# Patient Record
Sex: FEMALE | Race: WHITE | NOT HISPANIC OR LATINO | Employment: OTHER | URBAN - METROPOLITAN AREA
[De-identification: names, ages, dates, MRNs, and addresses within clinical notes are randomized per-mention and may not be internally consistent; named-entity substitution may affect disease eponyms.]

---

## 2017-04-07 DIAGNOSIS — I10 BENIGN ESSENTIAL HYPERTENSION: ICD-10-CM

## 2017-04-07 RX ORDER — LOSARTAN POTASSIUM AND HYDROCHLOROTHIAZIDE 12.5; 1 MG/1; MG/1
TABLET ORAL
Qty: 90 TABLET | Refills: 0 | Status: SHIPPED | OUTPATIENT
Start: 2017-04-07 | End: 2017-06-27

## 2017-06-27 DIAGNOSIS — I10 BENIGN ESSENTIAL HYPERTENSION: ICD-10-CM

## 2017-06-29 RX ORDER — LOSARTAN POTASSIUM AND HYDROCHLOROTHIAZIDE 12.5; 1 MG/1; MG/1
TABLET ORAL
Qty: 90 TABLET | Refills: 1 | Status: SHIPPED | OUTPATIENT
Start: 2017-06-29 | End: 2017-12-18

## 2017-06-29 NOTE — TELEPHONE ENCOUNTER
Hyzaar      Last Written Prescription Date: 4/7/17  Last Fill Quantity: 90, # refills: 0  Last Office Visit with G, P or [facility] prescribing provider: 10/3/16 with Lucas        Potassium   Date Value Ref Range Status   06/27/2016 3.6 3.4 - 5.3 mmol/L Final     Creatinine   Date Value Ref Range Status   06/27/2016 1.04 0.52 - 1.04 mg/dL Final     BP Readings from Last 3 Encounters:   10/03/16 146/88   09/12/16 (!) 180/96   08/01/16 165/90

## 2017-09-25 DIAGNOSIS — I10 BENIGN ESSENTIAL HYPERTENSION: ICD-10-CM

## 2017-09-26 RX ORDER — AMLODIPINE BESYLATE 10 MG/1
TABLET ORAL
Qty: 90 TABLET | Refills: 0 | Status: SHIPPED | OUTPATIENT
Start: 2017-09-26 | End: 2017-12-18

## 2017-09-26 NOTE — TELEPHONE ENCOUNTER
amLODIPine (NORVASC)      Last Written Prescription Date: 10/03/16  Last Fill Quantity: 90, # refills: 0  Last Office Visit with G, P or [facility] prescribing provider:  10/03/16  Future Office Visit:        BP Readings from Last 3 Encounters:   10/03/16 146/88   09/12/16 (!) 180/96   08/01/16 165/90

## 2017-10-29 DIAGNOSIS — I10 BENIGN ESSENTIAL HYPERTENSION: ICD-10-CM

## 2017-10-30 NOTE — TELEPHONE ENCOUNTER
Metoprolol      Last Written Prescription Date: 12/30/16  Last Fill Quantity: 90, # refills: 3    Last Office Visit with G, [facility] or [facility] prescribing provider:  10/03/2016   Future Office Visit:    Not scheduled    BP Readings from Last 3 Encounters:   10/03/16 146/88   09/12/16 (!) 180/96   08/01/16 165/90

## 2017-11-01 RX ORDER — METOPROLOL SUCCINATE 50 MG/1
TABLET, EXTENDED RELEASE ORAL
Qty: 90 TABLET | Refills: 0 | Status: SHIPPED | OUTPATIENT
Start: 2017-11-01 | End: 2017-12-18

## 2017-12-18 ENCOUNTER — OFFICE VISIT (OUTPATIENT)
Dept: INTERNAL MEDICINE | Facility: OTHER | Age: 62
End: 2017-12-18
Attending: INTERNAL MEDICINE
Payer: COMMERCIAL

## 2017-12-18 VITALS
HEART RATE: 79 BPM | BODY MASS INDEX: 38.51 KG/M2 | TEMPERATURE: 97.8 F | SYSTOLIC BLOOD PRESSURE: 138 MMHG | DIASTOLIC BLOOD PRESSURE: 84 MMHG | HEIGHT: 61 IN | WEIGHT: 204 LBS | OXYGEN SATURATION: 98 %

## 2017-12-18 DIAGNOSIS — I10 BENIGN ESSENTIAL HYPERTENSION: ICD-10-CM

## 2017-12-18 LAB
ALBUMIN SERPL-MCNC: 4 G/DL (ref 3.4–5)
ALP SERPL-CCNC: 91 U/L (ref 40–150)
ALT SERPL W P-5'-P-CCNC: 37 U/L (ref 0–50)
ANION GAP SERPL CALCULATED.3IONS-SCNC: 9 MMOL/L (ref 3–14)
AST SERPL W P-5'-P-CCNC: 22 U/L (ref 0–45)
BILIRUB SERPL-MCNC: 0.8 MG/DL (ref 0.2–1.3)
BUN SERPL-MCNC: 18 MG/DL (ref 7–30)
CALCIUM SERPL-MCNC: 9.3 MG/DL (ref 8.5–10.1)
CHLORIDE SERPL-SCNC: 105 MMOL/L (ref 94–109)
CO2 SERPL-SCNC: 24 MMOL/L (ref 20–32)
CREAT SERPL-MCNC: 1.33 MG/DL (ref 0.52–1.04)
GFR SERPL CREATININE-BSD FRML MDRD: 40 ML/MIN/1.7M2
GLUCOSE SERPL-MCNC: 126 MG/DL (ref 70–99)
POTASSIUM SERPL-SCNC: 3.7 MMOL/L (ref 3.4–5.3)
PROT SERPL-MCNC: 7.8 G/DL (ref 6.8–8.8)
SODIUM SERPL-SCNC: 138 MMOL/L (ref 133–144)

## 2017-12-18 PROCEDURE — 36415 COLL VENOUS BLD VENIPUNCTURE: CPT | Performed by: INTERNAL MEDICINE

## 2017-12-18 PROCEDURE — 99213 OFFICE O/P EST LOW 20 MIN: CPT | Performed by: INTERNAL MEDICINE

## 2017-12-18 PROCEDURE — 80053 COMPREHEN METABOLIC PANEL: CPT | Performed by: INTERNAL MEDICINE

## 2017-12-18 RX ORDER — METOPROLOL SUCCINATE 50 MG/1
50 TABLET, EXTENDED RELEASE ORAL DAILY
Qty: 90 TABLET | Refills: 3 | Status: SHIPPED | OUTPATIENT
Start: 2017-12-18 | End: 2019-01-09

## 2017-12-18 RX ORDER — LOSARTAN POTASSIUM AND HYDROCHLOROTHIAZIDE 12.5; 1 MG/1; MG/1
1 TABLET ORAL DAILY
Qty: 90 TABLET | Refills: 3 | Status: SHIPPED | OUTPATIENT
Start: 2017-12-18 | End: 2018-12-21

## 2017-12-18 RX ORDER — AMLODIPINE BESYLATE 10 MG/1
10 TABLET ORAL DAILY
Qty: 90 TABLET | Refills: 3 | Status: SHIPPED | OUTPATIENT
Start: 2017-12-18 | End: 2018-12-21

## 2017-12-18 ASSESSMENT — ANXIETY QUESTIONNAIRES
IF YOU CHECKED OFF ANY PROBLEMS ON THIS QUESTIONNAIRE, HOW DIFFICULT HAVE THESE PROBLEMS MADE IT FOR YOU TO DO YOUR WORK, TAKE CARE OF THINGS AT HOME, OR GET ALONG WITH OTHER PEOPLE: NOT DIFFICULT AT ALL
5. BEING SO RESTLESS THAT IT IS HARD TO SIT STILL: NOT AT ALL
6. BECOMING EASILY ANNOYED OR IRRITABLE: NOT AT ALL
GAD7 TOTAL SCORE: 5
7. FEELING AFRAID AS IF SOMETHING AWFUL MIGHT HAPPEN: SEVERAL DAYS
2. NOT BEING ABLE TO STOP OR CONTROL WORRYING: SEVERAL DAYS
3. WORRYING TOO MUCH ABOUT DIFFERENT THINGS: SEVERAL DAYS
1. FEELING NERVOUS, ANXIOUS, OR ON EDGE: SEVERAL DAYS

## 2017-12-18 ASSESSMENT — PAIN SCALES - GENERAL: PAINLEVEL: NO PAIN (0)

## 2017-12-18 ASSESSMENT — PATIENT HEALTH QUESTIONNAIRE - PHQ9
SUM OF ALL RESPONSES TO PHQ QUESTIONS 1-9: 1
5. POOR APPETITE OR OVEREATING: SEVERAL DAYS

## 2017-12-18 NOTE — MR AVS SNAPSHOT
"              After Visit Summary   2017    Dominga Salazar    MRN: [id number]           Patient Information     Date Of Birth          1955        Visit Information        Provider Department      2017 8:30 AM Manjinder Zavala, DO [facility]        Today's Diagnoses     Benign essential hypertension           Follow-ups after your visit        Who to contact     If you have questions or need follow up information about today's clinic visit or your schedule please contact [facility] directly at [phone].  Normal or non-critical lab and imaging results will be communicated to you by Socialbombhart, letter or phone within 4 business days after the clinic has received the results. If you do not hear from us within 7 days, please contact the clinic through Socialbombhart or phone. If you have a critical or abnormal lab result, we will notify you by phone as soon as possible.  Submit refill requests through HappyFactory or call your pharmacy and they will forward the refill request to us. Please allow 3 business days for your refill to be completed.          Additional Information About Your Visit        MyChart Information     HappyFactory lets you send messages to your doctor, view your test results, renew your prescriptions, schedule appointments and more. To sign up, go to [email or web address] . Click on \"Log in\" on the left side of the screen, which will take you to the Welcome page. Then click on \"Sign up Now\" on the right side of the page.     You will be asked to enter the access code listed below, as well as some personal information. Please follow the directions to create your username and password.     Your access code is: [email or web address]  Expires: 3/18/2018  8:56 AM     Your access code will  in 90 days. If you need help or a new code, please call your [facility] or [phone].        Care EveryWhere ID     This is your Care EveryWhere ID. This could be used by " "other organizations to access your [place] medical records  [id number]        Your Vitals Were     Pulse Temperature Height Pulse Oximetry BMI (Body Mass Index)       79 97.8  F (36.6  C) (Tympanic) 5' 1\" (1.549 m) 98% 38.55 kg/m2        Blood Pressure from Last 3 Encounters:   12/18/17 138/84   10/03/16 146/88   09/12/16 (!) 180/96    Weight from Last 3 Encounters:   12/18/17 204 lb (92.5 kg)   10/03/16 200 lb (90.7 kg)   09/12/16 201 lb (91.2 kg)              We Performed the Following     Comprehensive metabolic panel (BMP + Alb, Alk Phos, ALT, AST, Total. Bili, TP)          Today's Medication Changes          These changes are accurate as of: 12/18/17  8:56 AM.  If you have any questions, ask your nurse or doctor.               These medicines have changed or have updated prescriptions.        Dose/Directions    amLODIPine 10 MG tablet   Commonly known as:  NORVASC   This may have changed:  See the new instructions.   Used for:  Benign essential hypertension   Changed by:  Manjinder Zavala DO        Dose:  10 mg   Take 1 tablet (10 mg) by mouth daily   Quantity:  90 tablet   Refills:  3       losartan-hydrochlorothiazide 100-12.5 MG per tablet   Commonly known as:  HYZAAR   This may have changed:  See the new instructions.   Used for:  Benign essential hypertension   Changed by:  Manjinder Zavala DO        Dose:  1 tablet   Take 1 tablet by mouth daily   Quantity:  90 tablet   Refills:  3       metoprolol 50 MG 24 hr tablet   Commonly known as:  TOPROL-XL   This may have changed:  See the new instructions.   Used for:  Benign essential hypertension   Changed by:  Manjinder Zavala DO        Dose:  50 mg   Take 1 tablet (50 mg) by mouth daily   Quantity:  90 tablet   Refills:  3            Where to get your medicines      These medications were sent to [id number] - [place] - [address] AT [facility] [address], [place] [address]     " Phone:  [phone]     amLODIPine 10 MG tablet    losartan-hydrochlorothiazide 100-12.5 MG per tablet    metoprolol 50 MG 24 hr tablet                Primary Care Provider Office Phone # Fax #    Yoana Shook [phone] [address]  [place] [address]        Equal Access to Services     [facility]: Hadii aad ku hadasho Soomaali, waaxda luqadaha, qaybta kaalmada adeegyada, waxchristel petersonin hayaan adedino foxmarimaranoop campbell . So [facility] [phone].    ATENCIÓN: Si habla español, tiene a tracy disposición servicios gratuitos de asistencia lingüística. [facility] [phone].    We comply with applicable federal civil rights laws and [place] laws. We do not discriminate on the basis of race, color, national origin, age, disability, sex, sexual orientation, or gender identity.            Thank you!     Thank you for choosing [facility]  for your care. Our goal is always to provide you with excellent care. Hearing back from our patients is one way we can continue to improve our services. Please take a few minutes to complete the written survey that you may receive in the mail after your visit with us. Thank you!             Your Updated Medication List - Protect others around you: Learn how to safely use, store and throw away your medicines at www.disposemymeds.org.          This list is accurate as of: 12/18/17  8:56 AM.  Always use your most recent med list.                   Brand Name Dispense Instructions for use Diagnosis    ADVIL 200 MG tablet   Generic drug:  ibuprofen      Take 400 mg by mouth every 6 hours as needed for mild pain        amLODIPine 10 MG tablet    NORVASC    90 tablet    Take 1 tablet (10 mg) by mouth daily    Benign essential hypertension       aspirin 81 MG EC tablet     90 tablet    Take 1 tablet (81 mg) by mouth daily    Benign essential hypertension       CALCIUM 600-D PO      Take 1,600 mg by mouth daily        losartan-hydrochlorothiazide  100-12.5 MG per tablet    HYZAAR    90 tablet    Take 1 tablet by mouth daily    Benign essential hypertension       MAGNESIUM OXIDE PO      Take 250 mg by mouth daily        metoprolol 50 MG 24 hr tablet    TOPROL-XL    90 tablet    Take 1 tablet (50 mg) by mouth daily    Benign essential hypertension       VITAMIN D3 PO      Take 1,000 Units by mouth daily        ZANTAC PO      Take by mouth as needed for heartburn

## 2017-12-18 NOTE — PROGRESS NOTES
Internal Medicine:    Chief Complaint   Patient presents with     Hypertension     has not been checking b/p at home- monitoring salt intake- not excersing regularaly- no SOB or chest pain to report- not regular headaches - would like April to be pcp      Dominga presents today for follow up of her BP.  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  She denies any chest pain or SOB.  She has been taking the medications as prescribed. She does need refills of her medications today.  She is due for severe routine health maintenance measures but would prefer to follow up with Yoana Shook for these.           Patient Active Problem List   Diagnosis     ACP (advance care planning)     Benign essential hypertension          History reviewed. No pertinent past medical history.         Past Surgical History:   Procedure Laterality Date     GYN SURGERY  1984    cessarian            Social History   Substance Use Topics     Smoking status: Never Smoker     Smokeless tobacco: Never Used     Alcohol use No            Family History   Problem Relation Age of Onset     Hypertension Mother      Obesity Mother      OSTEOPOROSIS Mother      Arthritis Mother      Heart Failure Mother      Hypertension Father      Hyperlipidemia Father      Coronary Artery Disease Father      MI     Hypertension Brother      Hyperlipidemia Brother      Hypertension Son              No Known Allergies         Current Outpatient Prescriptions   Medication Sig Dispense Refill     metoprolol (TOPROL-XL) 50 MG 24 hr tablet Take 1 tablet (50 mg) by mouth daily 90 tablet 3     amLODIPine (NORVASC) 10 MG tablet Take 1 tablet (10 mg) by mouth daily 90 tablet 3     losartan-hydrochlorothiazide (HYZAAR) 100-12.5 MG per tablet Take 1 tablet by mouth daily 90 tablet 3     aspirin 81 MG EC tablet Take 1 tablet (81 mg) by mouth daily 90 tablet 3     ibuprofen (ADVIL) 200  "MG tablet Take 400 mg by mouth every 6 hours as needed for mild pain       Calcium Carbonate-Vitamin D (CALCIUM 600-D PO) Take 1,600 mg by mouth daily       Cholecalciferol (VITAMIN D3 PO) Take 1,000 Units by mouth daily       Ranitidine HCl (ZANTAC PO) Take by mouth as needed for heartburn       MAGNESIUM OXIDE PO Take 250 mg by mouth daily       [DISCONTINUED] metoprolol (TOPROL-XL) 50 MG 24 hr tablet TAKE 1 TABLET BY MOUTH EVERY DAY 90 tablet 0     [DISCONTINUED] amLODIPine (NORVASC) 10 MG tablet TAKE 1 TABLET BY MOUTH EVERY DAY 90 tablet 0     [DISCONTINUED] losartan-hydrochlorothiazide (HYZAAR) 100-12.5 MG per tablet TAKE 1 TABLET BY MOUTH DAILY 90 tablet 1       Review Of Systems:    Skin: negative  Eyes: negative  Respiratory: No shortness of breath, dyspnea on exertion, cough, or hemoptysis  Cardiovascular: negative  Neuro: negative    Objective:   /84 (BP Location: Left arm, Patient Position: Supine, Cuff Size: Adult Large)  Pulse 79  Temp 97.8  F (36.6  C) (Tympanic)  Ht 5' 1\" (1.549 m)  Wt 204 lb (92.5 kg)  SpO2 98%  BMI 38.55 kg/m2  EXAM:  Constitutional: alert and no distress   Cardiovascular: RRR. No murmurs, clicks gallops or rub  Respiratory: Lungs clear  Psychiatric: mentation appears normal and affect normal/bright  NEURO: No focal neurologic deficits    SKIN: No rashes         Orders placed or performed in visit on 12/18/17     metoprolol (TOPROL-XL) 50 MG 24 hr tablet     amLODIPine (NORVASC) 10 MG tablet     losartan-hydrochlorothiazide (HYZAAR) 100-12.5 MG per tablet     CMP pending    Assessment and Plan:    (I10) Benign essential hypertension  Comment:  BP is fine today being less than 140/90.  Denies any chest pain or SOB.  Still reports much social stress in her life.    Plan: metoprolol (TOPROL-XL) 50 MG 24 hr tablet,         amLODIPine (NORVASC) 10 MG tablet,         losartan-hydrochlorothiazide (HYZAAR) 100-12.5         MG per tablet, Comprehensive metabolic panel         " (BMP + Alb, Alk Phos, ALT, AST, Total. Bili,         TP)    Patient would prefer to follow up with Yoana Shook for her routine Health care maintenance.  She was instructed to see Yoana Shook in about three months for the routine Health maintenance measures and BP check.        Manjinder Zavala, DO

## 2017-12-18 NOTE — NURSING NOTE
"Chief Complaint   Patient presents with     Hypertension     has not been checking b/p at home- monitoring salt intake- not excersing regularaly- no SOB or chest pain to report- not regular headaches - would like April to be pcp        Initial /84 (BP Location: Left arm, Patient Position: Supine, Cuff Size: Adult Large)  Pulse 79  Temp 97.8  F (36.6  C) (Tympanic)  Ht 5' 1\" (1.549 m)  Wt 204 lb (92.5 kg)  SpO2 98%  BMI 38.55 kg/m2 Estimated body mass index is 38.55 kg/(m^2) as calculated from the following:    Height as of this encounter: 5' 1\" (1.549 m).    Weight as of this encounter: 204 lb (92.5 kg).  Medication Reconciliation: complete   Klaudia Elizalde LPN      "

## 2017-12-19 ASSESSMENT — ANXIETY QUESTIONNAIRES: GAD7 TOTAL SCORE: 5

## 2017-12-31 ENCOUNTER — HEALTH MAINTENANCE LETTER (OUTPATIENT)
Age: 62
End: 2017-12-31

## 2018-01-02 DIAGNOSIS — I10 BENIGN ESSENTIAL HYPERTENSION: ICD-10-CM

## 2018-01-02 LAB
ANION GAP SERPL CALCULATED.3IONS-SCNC: 7 MMOL/L (ref 3–14)
BUN SERPL-MCNC: 18 MG/DL (ref 7–30)
CALCIUM SERPL-MCNC: 9.1 MG/DL (ref 8.5–10.1)
CHLORIDE SERPL-SCNC: 107 MMOL/L (ref 94–109)
CO2 SERPL-SCNC: 25 MMOL/L (ref 20–32)
CREAT SERPL-MCNC: 1.11 MG/DL (ref 0.52–1.04)
GFR SERPL CREATININE-BSD FRML MDRD: 50 ML/MIN/1.7M2
GLUCOSE SERPL-MCNC: 106 MG/DL (ref 70–99)
POTASSIUM SERPL-SCNC: 3.9 MMOL/L (ref 3.4–5.3)
SODIUM SERPL-SCNC: 139 MMOL/L (ref 133–144)

## 2018-01-02 PROCEDURE — 36415 COLL VENOUS BLD VENIPUNCTURE: CPT | Performed by: NURSE PRACTITIONER

## 2018-01-02 PROCEDURE — 80048 BASIC METABOLIC PNL TOTAL CA: CPT | Performed by: NURSE PRACTITIONER

## 2018-12-19 DIAGNOSIS — I10 BENIGN ESSENTIAL HYPERTENSION: ICD-10-CM

## 2018-12-19 NOTE — LETTER
December 21, 2018      Dominga Salazar  [address] [place] [address] [address] [place] [address]        Dear Dominga,     APPOINTMENT REMINDER:     Our records indicates that it is time for you to be seen for office visit, medication review, and labs to be determined.     Your current medication request will be approved for one refill but you will need to be seen before any additional refills can be approved.  Taking care of your health is important to us, and ongoing visits with your provider are vital to your care.    We look forward to seeing you in the near future.  You may call our office at [phone] to schedule a visit.     Please disregard this notice if you have already made an appointment.      Sincerely,  Yoana Shook NP

## 2018-12-19 NOTE — TELEPHONE ENCOUNTER
losartan-hydrochlorothiazide (HYZAAR) 100-12.5 MG per tablet     Last Written Prescription Date:  12/18/17  Last Fill Quantity: 90,   # refills: 3  Last Office Visit: 12/18/17  Future Office visit:       amlodipine       Last Written Prescription Date:  12/18/17  Last Fill Quantity: 90,   # refills: 3  Last Office Visit: 12/18/17  Future Office visit:

## 2018-12-21 RX ORDER — AMLODIPINE BESYLATE 10 MG/1
10 TABLET ORAL DAILY
Qty: 30 TABLET | Refills: 0 | Status: SHIPPED | OUTPATIENT
Start: 2018-12-21 | End: 2019-01-09

## 2018-12-21 RX ORDER — LOSARTAN POTASSIUM AND HYDROCHLOROTHIAZIDE 12.5; 1 MG/1; MG/1
1 TABLET ORAL DAILY
Qty: 30 TABLET | Refills: 0 | Status: SHIPPED | OUTPATIENT
Start: 2018-12-21 | End: 2019-01-09

## 2019-01-09 ENCOUNTER — OFFICE VISIT (OUTPATIENT)
Dept: FAMILY MEDICINE | Facility: OTHER | Age: 64
End: 2019-01-09
Attending: NURSE PRACTITIONER
Payer: COMMERCIAL

## 2019-01-09 VITALS
HEART RATE: 80 BPM | TEMPERATURE: 98.1 F | HEIGHT: 61 IN | WEIGHT: 206 LBS | DIASTOLIC BLOOD PRESSURE: 76 MMHG | SYSTOLIC BLOOD PRESSURE: 130 MMHG | BODY MASS INDEX: 38.89 KG/M2 | OXYGEN SATURATION: 99 % | RESPIRATION RATE: 16 BRPM

## 2019-01-09 DIAGNOSIS — K21.9 GASTROESOPHAGEAL REFLUX DISEASE WITHOUT ESOPHAGITIS: ICD-10-CM

## 2019-01-09 DIAGNOSIS — D22.30 CHANGE IN FACIAL MOLE: ICD-10-CM

## 2019-01-09 DIAGNOSIS — I10 BENIGN ESSENTIAL HYPERTENSION: Primary | ICD-10-CM

## 2019-01-09 LAB
ALBUMIN SERPL-MCNC: 4.2 G/DL (ref 3.4–5)
ALP SERPL-CCNC: 91 U/L (ref 40–150)
ALT SERPL W P-5'-P-CCNC: 33 U/L (ref 0–50)
ANION GAP SERPL CALCULATED.3IONS-SCNC: 8 MMOL/L (ref 3–14)
AST SERPL W P-5'-P-CCNC: 20 U/L (ref 0–45)
BILIRUB SERPL-MCNC: 0.9 MG/DL (ref 0.2–1.3)
BUN SERPL-MCNC: 16 MG/DL (ref 7–30)
CALCIUM SERPL-MCNC: 9.5 MG/DL (ref 8.5–10.1)
CHLORIDE SERPL-SCNC: 102 MMOL/L (ref 94–109)
CO2 SERPL-SCNC: 24 MMOL/L (ref 20–32)
CREAT SERPL-MCNC: 1.26 MG/DL (ref 0.52–1.04)
GFR SERPL CREATININE-BSD FRML MDRD: 45 ML/MIN/{1.73_M2}
GLUCOSE SERPL-MCNC: 109 MG/DL (ref 70–99)
POTASSIUM SERPL-SCNC: 3.9 MMOL/L (ref 3.4–5.3)
PROT SERPL-MCNC: 8.2 G/DL (ref 6.8–8.8)
SODIUM SERPL-SCNC: 134 MMOL/L (ref 133–144)
TSH SERPL DL<=0.005 MIU/L-ACNC: 2.34 MU/L (ref 0.4–4)

## 2019-01-09 PROCEDURE — 80053 COMPREHEN METABOLIC PANEL: CPT | Performed by: NURSE PRACTITIONER

## 2019-01-09 PROCEDURE — 99214 OFFICE O/P EST MOD 30 MIN: CPT | Performed by: NURSE PRACTITIONER

## 2019-01-09 PROCEDURE — 36415 COLL VENOUS BLD VENIPUNCTURE: CPT | Performed by: NURSE PRACTITIONER

## 2019-01-09 PROCEDURE — 84443 ASSAY THYROID STIM HORMONE: CPT | Performed by: NURSE PRACTITIONER

## 2019-01-09 RX ORDER — METOPROLOL SUCCINATE 50 MG/1
50 TABLET, EXTENDED RELEASE ORAL DAILY
Qty: 90 TABLET | Refills: 1 | Status: SHIPPED | OUTPATIENT
Start: 2019-01-09 | End: 2019-07-20

## 2019-01-09 RX ORDER — AMLODIPINE BESYLATE 10 MG/1
10 TABLET ORAL DAILY
Qty: 90 TABLET | Refills: 1 | Status: SHIPPED | OUTPATIENT
Start: 2019-01-09 | End: 2019-07-20

## 2019-01-09 RX ORDER — LOSARTAN POTASSIUM AND HYDROCHLOROTHIAZIDE 12.5; 1 MG/1; MG/1
1 TABLET ORAL DAILY
Qty: 90 TABLET | Refills: 1 | Status: SHIPPED | OUTPATIENT
Start: 2019-01-09 | End: 2019-07-20

## 2019-01-09 ASSESSMENT — ANXIETY QUESTIONNAIRES
1. FEELING NERVOUS, ANXIOUS, OR ON EDGE: NOT AT ALL
5. BEING SO RESTLESS THAT IT IS HARD TO SIT STILL: NOT AT ALL
6. BECOMING EASILY ANNOYED OR IRRITABLE: NOT AT ALL
7. FEELING AFRAID AS IF SOMETHING AWFUL MIGHT HAPPEN: NOT AT ALL
IF YOU CHECKED OFF ANY PROBLEMS ON THIS QUESTIONNAIRE, HOW DIFFICULT HAVE THESE PROBLEMS MADE IT FOR YOU TO DO YOUR WORK, TAKE CARE OF THINGS AT HOME, OR GET ALONG WITH OTHER PEOPLE: NOT DIFFICULT AT ALL
3. WORRYING TOO MUCH ABOUT DIFFERENT THINGS: NOT AT ALL
2. NOT BEING ABLE TO STOP OR CONTROL WORRYING: NOT AT ALL
GAD7 TOTAL SCORE: 0

## 2019-01-09 ASSESSMENT — PATIENT HEALTH QUESTIONNAIRE - PHQ9
5. POOR APPETITE OR OVEREATING: NOT AT ALL
SUM OF ALL RESPONSES TO PHQ QUESTIONS 1-9: 0

## 2019-01-09 ASSESSMENT — MIFFLIN-ST. JEOR: SCORE: 1426.79

## 2019-01-09 ASSESSMENT — PAIN SCALES - GENERAL: PAINLEVEL: NO PAIN (0)

## 2019-01-09 NOTE — NURSING NOTE
"Chief Complaint   Patient presents with     Hypertension     Gastrophageal Reflux       Initial /76 (BP Location: Right arm, Patient Position: Sitting, Cuff Size: Adult Regular)   Pulse 80   Temp 98.1  F (36.7  C) (Tympanic)   Resp 16   Ht 1.549 m (5' 1\")   Wt 93.4 kg (206 lb)   SpO2 99%   BMI 38.92 kg/m   Estimated body mass index is 38.92 kg/m  as calculated from the following:    Height as of this encounter: 1.549 m (5' 1\").    Weight as of this encounter: 93.4 kg (206 lb).  Medication Reconciliation: complete    Rukhsana Mackenzie MA  "

## 2019-01-09 NOTE — PROGRESS NOTES
SUBJECTIVE:   Dominga Salazar is a 63 year old female who presents to clinic today for the following health issues:      Skin lesion    above upper left lip -near nasolabial fold.  Has become larger, is now quite raised and is 3/4 cm and circular. She would like to see dermatology for excision      Hypertension Follow-up    Outpatient blood pressures are not being checked.    Low Salt Diet: no added salt        GERD/Heartburn  Onset: years    Description:     Burning in chest: YES- depending on what she eats    Intensity: mild    Progression of Symptoms: waxing and waning    Accompanying Signs & Symptoms:  Does it feel like food gets stuck: no  Nausea: no  Vomiting (bloody?): no  Abdominal Pain: no  Black-Tarry stools: no:  Bloody stools: no    History:   Previous ulcers: no    Precipitating factors:   Caffeine use: YES  Alcohol use: no  NSAID/Aspirin use: no  Tobacco use: no   Worse with fatty foods.    Alleviating factors:  Staying away from fatty foods  Therapies Tried and outcome:tums and zantack            Amount of exercise or physical activity: 4-5 days/week for an average of 30-45 minutes    Problems taking medications regularly: No    Medication side effects: none    Diet: low salt        Problem list and histories reviewed & adjusted, as indicated.  Additional history: as documented      Patient Active Problem List   Diagnosis     ACP (advance care planning)     Benign essential hypertension     Gastroesophageal reflux disease without esophagitis     Past Surgical History:   Procedure Laterality Date     GYN SURGERY  1984    cessarian       Social History     Tobacco Use     Smoking status: Never Smoker     Smokeless tobacco: Never Used   Substance Use Topics     Alcohol use: No     Family History   Problem Relation Age of Onset     Hypertension Mother      Obesity Mother      Osteoporosis Mother      Arthritis Mother      Heart Failure Mother      Hypertension Father      Hyperlipidemia Father       Coronary Artery Disease Father         MI     Hypertension Brother      Hyperlipidemia Brother      Hypertension Son          Current Outpatient Medications   Medication Sig Dispense Refill     amLODIPine (NORVASC) 10 MG tablet Take 1 tablet (10 mg) by mouth daily 30 tablet 0     aspirin 81 MG EC tablet Take 1 tablet (81 mg) by mouth daily 90 tablet 3     Cholecalciferol (VITAMIN D3 PO) Take 1,000 Units by mouth daily       ibuprofen (ADVIL) 200 MG tablet Take 400 mg by mouth every 6 hours as needed for mild pain       losartan-hydrochlorothiazide (HYZAAR) 100-12.5 MG tablet Take 1 tablet by mouth daily 30 tablet 0     MAGNESIUM OXIDE PO Take 250 mg by mouth daily       metoprolol (TOPROL-XL) 50 MG 24 hr tablet Take 1 tablet (50 mg) by mouth daily 90 tablet 3     Ranitidine HCl (ZANTAC PO) Take by mouth as needed for heartburn       Calcium Carbonate-Vitamin D (CALCIUM 600-D PO) Take 1,600 mg by mouth daily       No Known Allergies  Recent Labs   Lab Test 01/02/18  1231 12/18/17  0849 06/27/16  1044   A1C  --   --  5.3   LDL  --   --  108*   HDL  --   --  73   TRIG  --   --  81   ALT  --  37 21   CR 1.11* 1.33* 1.04   GFRESTIMATED 50* 40* 54*   GFRESTBLACK 60* 49* 65   POTASSIUM 3.9 3.7 3.6      BP Readings from Last 3 Encounters:   01/09/19 130/76   12/18/17 138/84   10/03/16 146/88    Wt Readings from Last 3 Encounters:   01/09/19 93.4 kg (206 lb)   12/18/17 92.5 kg (204 lb)   10/03/16 90.7 kg (200 lb)                  Labs reviewed in EPIC    Reviewed and updated as needed this visit by clinical staff  Tobacco  Allergies  Meds  Med Hx  Surg Hx  Fam Hx  Soc Hx      Reviewed and updated as needed this visit by Provider         ROS:  Constitutional, HEENT, cardiovascular, pulmonary, gi and gu systems are negative, except as otherwise noted.    OBJECTIVE:     /76 (BP Location: Right arm, Patient Position: Sitting, Cuff Size: Adult Regular)   Pulse 80   Temp 98.1  F (36.7  C) (Tympanic)   Resp 16   Ht  "1.549 m (5' 1\")   Wt 93.4 kg (206 lb)   SpO2 99%   BMI 38.92 kg/m    Body mass index is 38.92 kg/m .       GENERAL: healthy, alert and no distress  EYES: Eyes grossly normal to inspection, PERRL and conjunctivae and sclerae normal  HENT: ear canals and TM's normal, nose and mouth without ulcers or lesions  NECK: no adenopathy, no asymmetry, masses, or scars and thyroid normal to palpation  RESP: lungs clear to auscultation - no rales, rhonchi or wheezes  CV: regular rate and rhythm, normal S1 S2, no S3 or S4, no murmur, click or rub, no peripheral edema and peripheral pulses strong  SKIN: 3/4 cm - tan circular raised lesion, above upper lip, left  PSYCH: mentation appears normal, affect normal/bright          ASSESSMENT/PLAN:       1. Benign essential hypertension  - metoprolol succinate ER (TOPROL-XL) 50 MG 24 hr tablet; Take 1 tablet (50 mg) by mouth daily  Dispense: 90 tablet; Refill: 1  - losartan-hydrochlorothiazide (HYZAAR) 100-12.5 MG tablet; Take 1 tablet by mouth daily  Dispense: 90 tablet; Refill: 1  - amLODIPine (NORVASC) 10 MG tablet; Take 1 tablet (10 mg) by mouth daily  Dispense: 90 tablet; Refill: 1  - TSH with free T4 reflex  - Comprehensive metabolic panel (BMP + Alb, Alk Phos, ALT, AST, Total. Bili, TP)    2. Gastroesophageal reflux disease without esophagitis  - Zantac PRN    3. Change in facial mole  - DERMATOLOGY REFERRAL      Follow-up 6 months      Yoana Shook NP  [facility]  "

## 2019-01-09 NOTE — PATIENT INSTRUCTIONS
ASSESSMENT/PLAN:       1. Benign essential hypertension  - metoprolol succinate ER (TOPROL-XL) 50 MG 24 hr tablet; Take 1 tablet (50 mg) by mouth daily  Dispense: 90 tablet; Refill: 1  - losartan-hydrochlorothiazide (HYZAAR) 100-12.5 MG tablet; Take 1 tablet by mouth daily  Dispense: 90 tablet; Refill: 1  - amLODIPine (NORVASC) 10 MG tablet; Take 1 tablet (10 mg) by mouth daily  Dispense: 90 tablet; Refill: 1  - TSH with free T4 reflex  - Comprehensive metabolic panel (BMP + Alb, Alk Phos, ALT, AST, Total. Bili, TP)    2. Gastroesophageal reflux disease without esophagitis  - Zantac PRN    3. Change in facial mole  - DERMATOLOGY REFERRAL      Follow-up 6 months      Yoana Shook NP  [facility]

## 2019-01-10 ASSESSMENT — ANXIETY QUESTIONNAIRES: GAD7 TOTAL SCORE: 0

## 2019-01-15 ENCOUNTER — TELEPHONE (OUTPATIENT)
Dept: FAMILY MEDICINE | Facility: OTHER | Age: 64
End: 2019-01-15

## 2019-01-15 NOTE — TELEPHONE ENCOUNTER
Called pt, notified her of her insurance's request for her to update her health screenings, mammogram, pap and colonoscopy.  Pt declined, and verbalized understands this came from the insurance company.

## 2019-03-05 ENCOUNTER — TRANSFERRED RECORDS (OUTPATIENT)
Dept: HEALTH INFORMATION MANAGEMENT | Facility: CLINIC | Age: 64
End: 2019-03-05

## 2019-07-20 DIAGNOSIS — I10 BENIGN ESSENTIAL HYPERTENSION: ICD-10-CM

## 2019-07-22 RX ORDER — LOSARTAN POTASSIUM AND HYDROCHLOROTHIAZIDE 12.5; 1 MG/1; MG/1
1 TABLET ORAL DAILY
Qty: 90 TABLET | Refills: 0 | Status: SHIPPED | OUTPATIENT
Start: 2019-07-22 | End: 2019-10-21

## 2019-07-22 RX ORDER — METOPROLOL SUCCINATE 50 MG/1
TABLET, EXTENDED RELEASE ORAL
Qty: 90 TABLET | Refills: 0 | Status: SHIPPED | OUTPATIENT
Start: 2019-07-22 | End: 2019-10-21

## 2019-07-22 RX ORDER — AMLODIPINE BESYLATE 10 MG/1
TABLET ORAL
Qty: 90 TABLET | Refills: 0 | Status: SHIPPED | OUTPATIENT
Start: 2019-07-22 | End: 2019-10-21

## 2019-10-21 DIAGNOSIS — I10 BENIGN ESSENTIAL HYPERTENSION: ICD-10-CM

## 2019-10-24 RX ORDER — LOSARTAN POTASSIUM AND HYDROCHLOROTHIAZIDE 12.5; 1 MG/1; MG/1
1 TABLET ORAL DAILY
Qty: 90 TABLET | Refills: 0 | Status: SHIPPED | OUTPATIENT
Start: 2019-10-24 | End: 2019-10-25

## 2019-10-24 RX ORDER — METOPROLOL SUCCINATE 50 MG/1
TABLET, EXTENDED RELEASE ORAL
Qty: 90 TABLET | Refills: 0 | Status: SHIPPED | OUTPATIENT
Start: 2019-10-24 | End: 2019-12-30

## 2019-10-24 RX ORDER — AMLODIPINE BESYLATE 10 MG/1
TABLET ORAL
Qty: 90 TABLET | Refills: 0 | Status: SHIPPED | OUTPATIENT
Start: 2019-10-24 | End: 2020-01-14

## 2019-10-25 ENCOUNTER — TELEPHONE (OUTPATIENT)
Dept: FAMILY MEDICINE | Facility: OTHER | Age: 64
End: 2019-10-25

## 2019-10-25 DIAGNOSIS — I10 ESSENTIAL HYPERTENSION: Primary | ICD-10-CM

## 2019-10-25 RX ORDER — HYDROCHLOROTHIAZIDE 12.5 MG/1
25 TABLET ORAL DAILY
Qty: 30 TABLET | Refills: 1 | Status: SHIPPED | OUTPATIENT
Start: 2019-10-25 | End: 2019-10-28

## 2019-10-25 RX ORDER — LOSARTAN POTASSIUM 100 MG/1
100 TABLET ORAL DAILY
Qty: 30 TABLET | Refills: 1 | Status: SHIPPED | OUTPATIENT
Start: 2019-10-25 | End: 2019-12-30

## 2019-10-25 NOTE — TELEPHONE ENCOUNTER
Rx sent for Losartan 100 mg and hydrochlorothiazide 12.5 mg    [facility]  [phone]

## 2019-10-25 NOTE — TELEPHONE ENCOUNTER
Received Fax from [facility]'s pharmacy stating that the Losartan that was ordered for the patient is on back order. They are requesting a new RX. Ashley A. Lechevalier, LPN on 10/25/2019 at 8:31 AM

## 2019-10-28 RX ORDER — HYDROCHLOROTHIAZIDE 12.5 MG/1
12.5 TABLET ORAL DAILY
Qty: 30 TABLET | Refills: 1 | Status: SHIPPED | OUTPATIENT
Start: 2019-10-28 | End: 2019-12-30

## 2019-10-28 NOTE — TELEPHONE ENCOUNTER
Patient called, states she doesn't like having to take the 2 different pills instead of just the one and is wondering if this can be changed to something else where she can just get the one pill. Informed her I would speak to PCP regarding this. Stated the 2 pills may just be temporary until the Losartan is no longer on back order. She states they only gave her a 30 and 15 day supply with 1 refill on the 2 medications and she will be out of the losartan tomorrow. Ashley A. Lechevalier, LPN on 10/28/2019 at 8:15 AM

## 2019-10-28 NOTE — TELEPHONE ENCOUNTER
The hydrochlorothiazide dose should have been 12.5 mg daily.  I resent correct prescription.   The losartan / hydrochlorothiazide combination was effective, and since it is unavailable - on backorder, the two separate medications are likely temporary.  I prescribed 1 month with 1 refill.  Assuming that by then, the prior combination drug will be available.    Yoana STRONG  [phone]

## 2019-12-30 ENCOUNTER — NURSE TRIAGE (OUTPATIENT)
Dept: FAMILY MEDICINE | Facility: OTHER | Age: 64
End: 2019-12-30

## 2019-12-30 ENCOUNTER — OFFICE VISIT (OUTPATIENT)
Dept: FAMILY MEDICINE | Facility: OTHER | Age: 64
End: 2019-12-30
Attending: NURSE PRACTITIONER
Payer: COMMERCIAL

## 2019-12-30 VITALS
WEIGHT: 208 LBS | HEART RATE: 100 BPM | DIASTOLIC BLOOD PRESSURE: 74 MMHG | HEIGHT: 61 IN | TEMPERATURE: 98.1 F | OXYGEN SATURATION: 98 % | BODY MASS INDEX: 39.27 KG/M2 | SYSTOLIC BLOOD PRESSURE: 120 MMHG

## 2019-12-30 DIAGNOSIS — I10 BENIGN ESSENTIAL HYPERTENSION: ICD-10-CM

## 2019-12-30 DIAGNOSIS — J01.00 SUBACUTE MAXILLARY SINUSITIS: Primary | ICD-10-CM

## 2019-12-30 PROCEDURE — 99213 OFFICE O/P EST LOW 20 MIN: CPT | Performed by: NURSE PRACTITIONER

## 2019-12-30 RX ORDER — HYDROCHLOROTHIAZIDE 12.5 MG/1
12.5 TABLET ORAL DAILY
Qty: 90 TABLET | Refills: 0 | Status: SHIPPED | OUTPATIENT
Start: 2019-12-30 | End: 2020-01-14

## 2019-12-30 RX ORDER — LOSARTAN POTASSIUM 100 MG/1
100 TABLET ORAL DAILY
Qty: 90 TABLET | Refills: 0 | Status: SHIPPED | OUTPATIENT
Start: 2019-12-30 | End: 2020-01-14

## 2019-12-30 RX ORDER — METOPROLOL SUCCINATE 50 MG/1
50 TABLET, EXTENDED RELEASE ORAL DAILY
Qty: 90 TABLET | Refills: 0 | Status: SHIPPED | OUTPATIENT
Start: 2019-12-30 | End: 2020-01-14

## 2019-12-30 RX ORDER — CEFDINIR 300 MG/1
300 CAPSULE ORAL 2 TIMES DAILY
Qty: 20 CAPSULE | Refills: 0 | Status: SHIPPED | OUTPATIENT
Start: 2019-12-30 | End: 2020-01-03

## 2019-12-30 ASSESSMENT — MIFFLIN-ST. JEOR: SCORE: 1430.86

## 2019-12-30 ASSESSMENT — PAIN SCALES - GENERAL: PAINLEVEL: NO PAIN (0)

## 2019-12-30 NOTE — PATIENT INSTRUCTIONS
Assessment & Plan     1. Subacute maxillary sinusitis  - cefdinir (OMNICEF) 300 MG capsule; Take 1 capsule (300 mg) by mouth 2 times daily for 10 days  Dispense: 20 capsule; Refill: 0      Insure adequate fluid intake  Get plenty of rest  Monitor for temp at home, treat with OTC Tylenol or Ibuprofen per package instruction.  Humidity at home (add bacteriostatic solution to humidifier)  Please return in you do not improve  To UC or ER with persistent, worsening, or concerning symptoms      Please set up an appointment to Follow-up regarding your blood pressure  Mammogram due        Yoana Shook CNP  [facility]

## 2019-12-30 NOTE — PROGRESS NOTES
Dominga Salazar is a 64 year old female who presents to clinic today for the following health issues:      Acute Illness   Acute illness concerns: sinus infection  Onset: 3 weeks with worsening in the last week    Fever: YES    Chills/Sweats: YES- chills    Headache (location?): no    Sinus Pressure:YES- facial pain and mucopurulent discharge    Conjunctivitis:  no    Ear Pain: no    Rhinorrhea: no    Congestion: no    Sore Throat: YES- in evening     Cough: no    Wheeze: no    Decreased Appetite: no    Nausea: no    Vomiting: no    Diarrhea:  no    Dysuria/Freq.: no    Fatigue/Achiness: yes    Sick/Strep Exposure: no     Therapies Tried and outcome: otc organic nasal spray- mucinex           Patient Active Problem List   Diagnosis     ACP (advance care planning)     Benign essential hypertension     Gastroesophageal reflux disease without esophagitis     Past Surgical History:   Procedure Laterality Date      SECTION         Social History     Tobacco Use     Smoking status: Never Smoker     Smokeless tobacco: Never Used   Substance Use Topics     Alcohol use: No     Family History   Problem Relation Age of Onset     Hypertension Mother      Obesity Mother      Osteoporosis Mother      Arthritis Mother      Heart Failure Mother      Hypertension Father      Hyperlipidemia Father      Coronary Artery Disease Father         MI     Hypertension Brother      Hyperlipidemia Brother      Hypertension Son            Current Outpatient Medications   Medication Sig Dispense Refill     amLODIPine (NORVASC) 10 MG tablet TAKE 1 TABLET(10 MG) BY MOUTH DAILY 90 tablet 0     aspirin 81 MG EC tablet Take 1 tablet (81 mg) by mouth daily 90 tablet 3     Calcium Carbonate-Vitamin D (CALCIUM 600-D PO) Take 1,600 mg by mouth daily       Cholecalciferol (VITAMIN D3 PO) Take 1,000 Units by mouth daily       hydrochlorothiazide (HYDRODIURIL) 12.5 MG tablet Take 1 tablet (12.5 mg) by mouth daily 30 tablet 1     ibuprofen  "(ADVIL) 200 MG tablet Take 400 mg by mouth every 6 hours as needed for mild pain       losartan (COZAAR) 100 MG tablet Take 1 tablet (100 mg) by mouth daily 30 tablet 1     MAGNESIUM OXIDE PO Take 250 mg by mouth daily       metoprolol succinate ER (TOPROL-XL) 50 MG 24 hr tablet TAKE 1 TABLET(50 MG) BY MOUTH DAILY 90 tablet 0       No Known Allergies       Recent Labs   Lab Test 01/09/19  1505 01/02/18  1231 12/18/17  0849 06/27/16  1044   A1C  --   --   --  5.3   LDL  --   --   --  108*   HDL  --   --   --  73   TRIG  --   --   --  81   ALT 33  --  37 21   CR 1.26* 1.11* 1.33* 1.04   GFRESTIMATED 45* 50* 40* 54*   GFRESTBLACK 52* 60* 49* 65   POTASSIUM 3.9 3.9 3.7 3.6   TSH 2.34  --   --   --         BP Readings from Last 3 Encounters:   12/30/19 120/74   01/09/19 130/76   12/18/17 138/84    Wt Readings from Last 3 Encounters:   12/30/19 94.3 kg (208 lb)   01/09/19 93.4 kg (206 lb)   12/18/17 92.5 kg (204 lb)                 Reviewed and updated as needed this visit by Provider           Review of Systems   ROS COMP: Constitutional, HEENT, cardiovascular, pulmonary, gi and gu systems are negative, except as otherwise noted.        Objective    /74 (BP Location: Right arm, Patient Position: Chair, Cuff Size: Adult Large)   Pulse 100   Temp 98.1  F (36.7  C) (Tympanic)   Ht 1.549 m (5' 1\")   Wt 94.3 kg (208 lb)   SpO2 98%   BMI 39.30 kg/m    Body mass index is 39.3 kg/m .       Physical Exam   GENERAL: healthy, alert and no distress  HENT: sinuses: maxillary, frontal tenderness on right  NECK: no adenopathy, no asymmetry, masses, or scars and thyroid normal to palpation  RESP: lungs clear to auscultation - no rales, rhonchi or wheezes  CV: regular rate and rhythm, normal S1 S2, no S3 or S4, no murmur, click or rub, no peripheral edema and peripheral pulses strong  MS: no gross musculoskeletal defects noted, no edema  SKIN: no suspicious lesions or rashes            Assessment & Plan     1. Subacute " maxillary sinusitis  - cefdinir (OMNICEF) 300 MG capsule; Take 1 capsule (300 mg) by mouth 2 times daily for 10 days  Dispense: 20 capsule; Refill: 0      Insure adequate fluid intake  Get plenty of rest  Monitor for temp at home, treat with OTC Tylenol or Ibuprofen per package instruction.  Humidity at home (add bacteriostatic solution to humidifier)  Please return in you do not improve  To UC or ER with persistent, worsening, or concerning symptoms      Please set up an appointment to Follow-up regarding your blood pressure  Mammogram due        Yoana Shook, CNP  [facility]

## 2019-12-30 NOTE — NURSING NOTE
"Chief Complaint   Patient presents with     Sinus Problem     right sided congestion        Initial /74 (BP Location: Right arm, Patient Position: Chair, Cuff Size: Adult Large)   Pulse 100   Temp 98.1  F (36.7  C) (Tympanic)   Ht 1.549 m (5' 1\")   Wt 94.3 kg (208 lb)   SpO2 98%   BMI 39.30 kg/m   Estimated body mass index is 39.3 kg/m  as calculated from the following:    Height as of this encounter: 1.549 m (5' 1\").    Weight as of this encounter: 94.3 kg (208 lb).  Medication Reconciliation: complete  Keke Torrez LPN    "

## 2019-12-30 NOTE — TELEPHONE ENCOUNTER
"  Additional Information    Negative: Sounds like a life-threatening emergency to the triager    Negative: Difficulty breathing, and not from stuffy nose (e.g., not relieved by cleaning out the nose)    Negative: SEVERE headache and has fever    Negative: Patient sounds very sick or weak to the triager    Negative: SEVERE sinus pain    Negative: Severe headache    Negative: Redness or swelling on the cheek, forehead, or around the eye    Negative: Fever > 103 F (39.4 C)    Negative: Fever > 101 F (38.3 C) and over 60 years of age    Negative: Fever > 100.0 F (37.8 C) and has diabetes mellitus or a weak immune system (e.g., HIV positive, cancer chemotherapy, organ transplant, splenectomy, chronic steroids)    Negative: Fever > 100.0 F (37.8 C) and bedridden (e.g., nursing home patient, stroke, chronic illness, recovering from surgery)    Negative: Fever present > 3 days (72 hours)    Negative: Fever returns after gone for over 24 hours and symptoms worse or not improved    Negative: Sinus pain (not just congestion) and fever    Negative: Earache    Negative: Sinus congestion (pressure, fullness) present > 10 days    Negative: Nasal discharge present > 10 days    Negative: Using nasal washes and pain medicine > 24 hours and sinus pain (lower forehead, cheekbone, or eye) persists    Negative: Lots of coughing    Negative: Patient wants to be seen    Negative: Sinus congestion as part of a cold, present < 10 days    Answer Assessment - Initial Assessment Questions  1. LOCATION: \"Where does it hurt?\"       Right side of my sinus   2. ONSET: \"When did the sinus pain start?\"  (e.g., hours, days)       3 weeks ago   3. SEVERITY: \"How bad is the pain?\"   (Scale 1-10; mild, moderate or severe)    - MILD (1-3): doesn't interfere with normal activities     - MODERATE (4-7): interferes with normal activities (e.g., work or school) or awakens from sleep    - SEVERE (8-10): excruciating pain and patient unable to do any normal " "activities         Mild   4. RECURRENT SYMPTOM: \"Have you ever had sinus problems before?\" If so, ask: \"When was the last time?\" and \"What happened that time?\"       This is not something I am normally prone to.   5. NASAL CONGESTION: \"Is the nose blocked?\" If so, ask, \"Can you open it or must you breathe through the mouth?\"      My right side is blocked, yes I have a foul odor and taste in my mouth   6. NASAL DISCHARGE: \"Do you have discharge from your nose?\" If so ask, \"What color?\"      Did not say, states its blocked   7. FEVER: \"Do you have a fever?\" If so, ask: \"What is it, how was it measured, and when did it start?\"       NO   8. OTHER SYMPTOMS: \"Do you have any other symptoms?\" (e.g., sore throat, cough, earache, difficulty breathing)      Foul taste and odor from mouth   9. PREGNANCY: \"Is there any chance you are pregnant?\" \"When was your last menstrual period?\"      NO    Protocols used: SINUS PAIN AND CONGESTION-A-OH    "

## 2020-01-03 ENCOUNTER — TELEPHONE (OUTPATIENT)
Dept: FAMILY MEDICINE | Facility: OTHER | Age: 65
End: 2020-01-03

## 2020-01-03 DIAGNOSIS — J01.00 SUBACUTE MAXILLARY SINUSITIS: Primary | ICD-10-CM

## 2020-01-03 RX ORDER — CEFPROZIL 500 MG/1
500 TABLET, FILM COATED ORAL 2 TIMES DAILY
Qty: 14 TABLET | Refills: 0 | Status: SHIPPED | OUTPATIENT
Start: 2020-01-03 | End: 2020-01-15

## 2020-01-03 NOTE — TELEPHONE ENCOUNTER
discontinue Omnicef - I sent Cefzil to pharmacy for 7 days.  Probiotic daily.  Come in if diarrhea continues or worsens.  UC or ER as needed    Yoana STRONG  [phone]

## 2020-01-03 NOTE — TELEPHONE ENCOUNTER
Pt calling, states has been taking Omnicef sin Mon for sinus infection    Is working, however, has pretty bad diarrhea, can somewhat control it with Imodium, but doesn't;t feel she should take it so often with her HTN?    Requesting antibiotic change, or,will cont with the Imodium if you think it's ok to take fir the duration of the tx.    Mary Ann Gibson LPN

## 2020-01-13 NOTE — PROGRESS NOTES
Dominga Salazar is a 64 year old female who presents to clinic today for the following health issues:          Hypertension Follow-up    Do you check your blood pressure regularly outside of the clinic? No     Are you following a low salt diet? No    Are your blood pressures ever more than 140 on the top number (systolic) OR more   than 90 on the bottom number (diastolic), for example 140/90? No        GERD/Heartburn    Duration: ongoing    Description (location/character/radiation): burning in chest/throat    Intensity:  mild    Accompanying signs and symptoms:  food getting stuck: no   nausea/vomiting/blood: no   abdominal pain: no   black/tarry or bloody stools: no :    History (similar episodes/previous evaluation): None    Precipitating or alleviating factors:  worse with fatty foods and cinnamon.  current NSAID/Aspirin use: YES    Therapies tried and outcome: antacids          How many servings of fruits and vegetables do you eat daily?  2-3    On average, how many sweetened beverages do you drink each day (Examples: soda, juice, sweet tea, etc.  Do NOT count diet or artificially sweetened beverages)?   0    How many days per week do you miss taking your medication? 0        PHQ-9 SCORE 2017 2019 1/15/2020   PHQ-9 Total Score 1 0 1     MARTY-7 SCORE 2017 2019 1/15/2020   Total Score 5 0 3           Patient Active Problem List   Diagnosis     ACP (advance care planning)     Benign essential hypertension     Gastroesophageal reflux disease without esophagitis     Past Surgical History:   Procedure Laterality Date      SECTION  1984       Social History     Tobacco Use     Smoking status: Never Smoker     Smokeless tobacco: Never Used   Substance Use Topics     Alcohol use: No     Family History   Problem Relation Age of Onset     Hypertension Mother      Obesity Mother      Osteoporosis Mother      Arthritis Mother      Heart Failure Mother      Hypertension Father       Hyperlipidemia Father      Coronary Artery Disease Father         MI     Hypertension Brother      Hyperlipidemia Brother      Hypertension Son              Current Outpatient Medications   Medication Sig Dispense Refill     amLODIPine (NORVASC) 10 MG tablet Take 1 tablet (10 mg) by mouth daily 90 tablet 1     aspirin 81 MG EC tablet Take 1 tablet (81 mg) by mouth daily 90 tablet 3     Calcium Carbonate-Vitamin D (CALCIUM 600-D PO) Take 1,600 mg by mouth daily       Cholecalciferol (VITAMIN D3 PO) Take 1,000 Units by mouth daily       hydrochlorothiazide (HYDRODIURIL) 12.5 MG tablet Take 1 tablet (12.5 mg) by mouth daily 90 tablet 1     ibuprofen (ADVIL) 200 MG tablet Take 400 mg by mouth every 6 hours as needed for mild pain       losartan (COZAAR) 100 MG tablet Take 1 tablet (100 mg) by mouth daily 90 tablet 1     MAGNESIUM OXIDE PO Take 250 mg by mouth daily       metoprolol succinate ER (TOPROL-XL) 50 MG 24 hr tablet Take 1 tablet (50 mg) by mouth daily 90 tablet 1         No Known Allergies         Recent Labs   Lab Test 01/09/19  1505 01/02/18  1231 12/18/17  0849 06/27/16  1044   A1C  --   --   --  5.3   LDL  --   --   --  108*   HDL  --   --   --  73   TRIG  --   --   --  81   ALT 33  --  37 21   CR 1.26* 1.11* 1.33* 1.04   GFRESTIMATED 45* 50* 40* 54*   GFRESTBLACK 52* 60* 49* 65   POTASSIUM 3.9 3.9 3.7 3.6   TSH 2.34  --   --   --           BP Readings from Last 3 Encounters:   01/15/20 118/74   12/30/19 120/74   01/09/19 130/76    Wt Readings from Last 3 Encounters:   01/15/20 93 kg (205 lb)   12/30/19 94.3 kg (208 lb)   01/09/19 93.4 kg (206 lb)                 Reviewed and updated as needed this visit by Provider         Review of Systems   ROS COMP: Constitutional, HEENT, cardiovascular, pulmonary, GI, , musculoskeletal, neuro, skin, endocrine and psych systems are negative, except as otherwise noted.          Objective    /74 (BP Location: Left arm, Patient Position: Sitting, Cuff Size: Adult  Regular)   Pulse 81   Temp 99.2  F (37.3  C) (Tympanic)   Wt 93 kg (205 lb)   SpO2 97%   BMI 38.73 kg/m    Body mass index is 38.73 kg/m .         Physical Exam   GENERAL: healthy, alert and no distress  EYES: Eyes grossly normal to inspection, PERRL and conjunctivae and sclerae normal  HENT: ear canals and TM's normal, nose and mouth without ulcers or lesions  NECK: no adenopathy, no asymmetry, masses, or scars and thyroid normal to palpation  RESP: lungs clear to auscultation - no rales, rhonchi or wheezes  CV: regular rate and rhythm, normal S1 S2, no S3 or S4, no murmur, click or rub, no peripheral edema and peripheral pulses strong  ABDOMEN: soft, nontender, no hepatosplenomegaly, no masses and bowel sounds normal  MS: no gross musculoskeletal defects noted, no edema  PSYCH: mentation appears normal, affect normal/bright    Results for orders placed or performed in visit on 01/15/20   Lipid Profile     Status: Abnormal   Result Value Ref Range    Cholesterol 220 (H) <200 mg/dL    Triglycerides 87 <150 mg/dL    HDL Cholesterol 70 >49 mg/dL    LDL Cholesterol Calculated 133 (H) <100 mg/dL    Non HDL Cholesterol 150 (H) <130 mg/dL   Comprehensive metabolic panel (BMP + Alb, Alk Phos, ALT, AST, Total. Bili, TP)     Status: Abnormal   Result Value Ref Range    Sodium 139 133 - 144 mmol/L    Potassium 3.8 3.4 - 5.3 mmol/L    Chloride 106 94 - 109 mmol/L    Carbon Dioxide 24 20 - 32 mmol/L    Anion Gap 9 3 - 14 mmol/L    Glucose 124 (H) 70 - 99 mg/dL    Urea Nitrogen 19 7 - 30 mg/dL    Creatinine 1.28 (H) 0.52 - 1.04 mg/dL    GFR Estimate 44 (L) >60 mL/min/[1.73_m2]    GFR Estimate If Black 51 (L) >60 mL/min/[1.73_m2]    Calcium 9.7 8.5 - 10.1 mg/dL    Bilirubin Total 0.9 0.2 - 1.3 mg/dL    Albumin 3.8 3.4 - 5.0 g/dL    Protein Total 8.0 6.8 - 8.8 g/dL    Alkaline Phosphatase 96 40 - 150 U/L    ALT 48 0 - 50 U/L    AST 21 0 - 45 U/L   *UA reflex to Microscopic and Culture - [facility]     Status: Abnormal    Result Value Ref Range    Color Urine Yellow     Appearance Urine Clear     Glucose Urine Negative NEG^Negative mg/dL    Bilirubin Urine Negative NEG^Negative    Ketones Urine Negative NEG^Negative mg/dL    Specific Gravity Urine 1.010 1.003 - 1.035    Blood Urine Negative NEG^Negative    pH Urine 6.5 5.0 - 7.0 pH    Protein Albumin Urine Negative NEG^Negative mg/dL    Urobilinogen Urine 0.2 0.2 - 1.0 EU/dL    Nitrite Urine Negative NEG^Negative    Leukocyte Esterase Urine Trace (A) NEG^Negative    Source Midstream Urine    TSH with free T4 reflex     Status: None   Result Value Ref Range    TSH 3.66 0.40 - 4.00 mU/L   Urine Microscopic     Status: None   Result Value Ref Range    WBC Urine 0 - 5 OTO5^0 - 5 /HPF    RBC Urine O - 2 OTO2^O - 2 /HPF    Squamous Epithelial /LPF Urine Few FEW^Few /LPF         The 10-year ASCVD risk score (Bety FERRER Jr., et al., 2013) is: 5.4%    Values used to calculate the score:      Age: 64 years      Sex: Female      Is Non- : No      Diabetic: No      Tobacco smoker: No      Systolic Blood Pressure: 118 mmHg      Is BP treated: Yes      HDL Cholesterol: 70 mg/dL      Total Cholesterol: 220 mg/dL        Assessment & Plan     1. Benign essential hypertension  - Lipid Profile  - Comprehensive metabolic panel (BMP + Alb, Alk Phos, ALT, AST, Total. Bili, TP)  - UA reflex to Microscopic and Culture - [facility]  - TSH with free T4 reflex  - amLODIPine (NORVASC) 10 MG tablet; Take 1 tablet (10 mg) by mouth daily  Dispense: 90 tablet; Refill: 1  - losartan (COZAAR) 100 MG tablet; Take 1 tablet (100 mg) by mouth daily  Dispense: 90 tablet; Refill: 1  - hydrochlorothiazide (HYDRODIURIL) 12.5 MG tablet; Take 1 tablet (12.5 mg) by mouth daily  Dispense: 90 tablet; Refill: 1  - metoprolol succinate ER (TOPROL-XL) 50 MG 24 hr tablet; Take 1 tablet (50 mg) by mouth daily  Dispense: 90 tablet; Refill: 1  - Urine Microscopic    2. Gastroesophageal reflux disease without  esophagitis  - Follow-up as needed    3. Encounter for screening mammogram for breast cancer  - MA Screening Digital Bilateral; Future         Please schedule a pap when you set up your next appointment      Return in about 6 months (around 7/15/2020).         Yoana Shook CNP  [facility]

## 2020-01-15 ENCOUNTER — OFFICE VISIT (OUTPATIENT)
Dept: FAMILY MEDICINE | Facility: OTHER | Age: 65
End: 2020-01-15
Attending: NURSE PRACTITIONER
Payer: COMMERCIAL

## 2020-01-15 VITALS
DIASTOLIC BLOOD PRESSURE: 74 MMHG | BODY MASS INDEX: 38.73 KG/M2 | WEIGHT: 205 LBS | SYSTOLIC BLOOD PRESSURE: 118 MMHG | HEART RATE: 81 BPM | OXYGEN SATURATION: 97 % | TEMPERATURE: 99.2 F

## 2020-01-15 DIAGNOSIS — Z12.31 ENCOUNTER FOR SCREENING MAMMOGRAM FOR BREAST CANCER: ICD-10-CM

## 2020-01-15 DIAGNOSIS — R73.09 ELEVATED GLUCOSE LEVEL: Primary | ICD-10-CM

## 2020-01-15 DIAGNOSIS — K21.9 GASTROESOPHAGEAL REFLUX DISEASE WITHOUT ESOPHAGITIS: ICD-10-CM

## 2020-01-15 DIAGNOSIS — I10 BENIGN ESSENTIAL HYPERTENSION: Primary | ICD-10-CM

## 2020-01-15 LAB
ALBUMIN SERPL-MCNC: 3.8 G/DL (ref 3.4–5)
ALBUMIN UR-MCNC: NEGATIVE MG/DL
ALP SERPL-CCNC: 96 U/L (ref 40–150)
ALT SERPL W P-5'-P-CCNC: 48 U/L (ref 0–50)
ANION GAP SERPL CALCULATED.3IONS-SCNC: 9 MMOL/L (ref 3–14)
APPEARANCE UR: CLEAR
AST SERPL W P-5'-P-CCNC: 21 U/L (ref 0–45)
BILIRUB SERPL-MCNC: 0.9 MG/DL (ref 0.2–1.3)
BILIRUB UR QL STRIP: NEGATIVE
BUN SERPL-MCNC: 19 MG/DL (ref 7–30)
CALCIUM SERPL-MCNC: 9.7 MG/DL (ref 8.5–10.1)
CHLORIDE SERPL-SCNC: 106 MMOL/L (ref 94–109)
CHOLEST SERPL-MCNC: 220 MG/DL
CO2 SERPL-SCNC: 24 MMOL/L (ref 20–32)
COLOR UR AUTO: YELLOW
CREAT SERPL-MCNC: 1.28 MG/DL (ref 0.52–1.04)
EST. AVERAGE GLUCOSE BLD GHB EST-MCNC: 120 MG/DL
GFR SERPL CREATININE-BSD FRML MDRD: 44 ML/MIN/{1.73_M2}
GLUCOSE SERPL-MCNC: 124 MG/DL (ref 70–99)
GLUCOSE UR STRIP-MCNC: NEGATIVE MG/DL
HBA1C MFR BLD: 5.8 % (ref 0–5.6)
HDLC SERPL-MCNC: 70 MG/DL
HGB UR QL STRIP: NEGATIVE
KETONES UR STRIP-MCNC: NEGATIVE MG/DL
LDLC SERPL CALC-MCNC: 133 MG/DL
LEUKOCYTE ESTERASE UR QL STRIP: ABNORMAL
NITRATE UR QL: NEGATIVE
NON-SQ EPI CELLS #/AREA URNS LPF: NORMAL /LPF
NONHDLC SERPL-MCNC: 150 MG/DL
PH UR STRIP: 6.5 PH (ref 5–7)
POTASSIUM SERPL-SCNC: 3.8 MMOL/L (ref 3.4–5.3)
PROT SERPL-MCNC: 8 G/DL (ref 6.8–8.8)
RBC #/AREA URNS AUTO: NORMAL /HPF
SODIUM SERPL-SCNC: 139 MMOL/L (ref 133–144)
SOURCE: ABNORMAL
SP GR UR STRIP: 1.01 (ref 1–1.03)
TRIGL SERPL-MCNC: 87 MG/DL
TSH SERPL DL<=0.005 MIU/L-ACNC: 3.66 MU/L (ref 0.4–4)
UROBILINOGEN UR STRIP-ACNC: 0.2 EU/DL (ref 0.2–1)
WBC #/AREA URNS AUTO: NORMAL /HPF

## 2020-01-15 PROCEDURE — 36415 COLL VENOUS BLD VENIPUNCTURE: CPT | Performed by: NURSE PRACTITIONER

## 2020-01-15 PROCEDURE — 80053 COMPREHEN METABOLIC PANEL: CPT | Performed by: NURSE PRACTITIONER

## 2020-01-15 PROCEDURE — 83036 HEMOGLOBIN GLYCOSYLATED A1C: CPT | Performed by: NURSE PRACTITIONER

## 2020-01-15 PROCEDURE — 99214 OFFICE O/P EST MOD 30 MIN: CPT | Performed by: NURSE PRACTITIONER

## 2020-01-15 PROCEDURE — 40000788 ZZHCL STATISTIC ESTIMATED AVERAGE GLUCOSE: Performed by: NURSE PRACTITIONER

## 2020-01-15 PROCEDURE — 84443 ASSAY THYROID STIM HORMONE: CPT | Performed by: NURSE PRACTITIONER

## 2020-01-15 PROCEDURE — 81001 URINALYSIS AUTO W/SCOPE: CPT | Performed by: NURSE PRACTITIONER

## 2020-01-15 PROCEDURE — 80061 LIPID PANEL: CPT | Performed by: NURSE PRACTITIONER

## 2020-01-15 RX ORDER — METOPROLOL SUCCINATE 50 MG/1
50 TABLET, EXTENDED RELEASE ORAL DAILY
Qty: 90 TABLET | Refills: 1 | Status: SHIPPED | OUTPATIENT
Start: 2020-01-15 | End: 2020-08-19

## 2020-01-15 RX ORDER — AMLODIPINE BESYLATE 10 MG/1
10 TABLET ORAL DAILY
Qty: 90 TABLET | Refills: 1 | Status: SHIPPED | OUTPATIENT
Start: 2020-01-15 | End: 2020-07-21

## 2020-01-15 RX ORDER — HYDROCHLOROTHIAZIDE 12.5 MG/1
12.5 TABLET ORAL DAILY
Qty: 90 TABLET | Refills: 1 | Status: SHIPPED | OUTPATIENT
Start: 2020-01-15 | End: 2020-08-19

## 2020-01-15 RX ORDER — LOSARTAN POTASSIUM 100 MG/1
100 TABLET ORAL DAILY
Qty: 90 TABLET | Refills: 1 | Status: SHIPPED | OUTPATIENT
Start: 2020-01-15 | End: 2020-08-19

## 2020-01-15 ASSESSMENT — ANXIETY QUESTIONNAIRES
1. FEELING NERVOUS, ANXIOUS, OR ON EDGE: SEVERAL DAYS
GAD7 TOTAL SCORE: 3
IF YOU CHECKED OFF ANY PROBLEMS ON THIS QUESTIONNAIRE, HOW DIFFICULT HAVE THESE PROBLEMS MADE IT FOR YOU TO DO YOUR WORK, TAKE CARE OF THINGS AT HOME, OR GET ALONG WITH OTHER PEOPLE: NOT DIFFICULT AT ALL
2. NOT BEING ABLE TO STOP OR CONTROL WORRYING: SEVERAL DAYS
6. BECOMING EASILY ANNOYED OR IRRITABLE: NOT AT ALL
5. BEING SO RESTLESS THAT IT IS HARD TO SIT STILL: NOT AT ALL
4. TROUBLE RELAXING: NOT AT ALL
3. WORRYING TOO MUCH ABOUT DIFFERENT THINGS: SEVERAL DAYS
7. FEELING AFRAID AS IF SOMETHING AWFUL MIGHT HAPPEN: NOT AT ALL

## 2020-01-15 ASSESSMENT — PAIN SCALES - GENERAL: PAINLEVEL: NO PAIN (0)

## 2020-01-15 ASSESSMENT — PATIENT HEALTH QUESTIONNAIRE - PHQ9: SUM OF ALL RESPONSES TO PHQ QUESTIONS 1-9: 1

## 2020-01-15 NOTE — RESULT ENCOUNTER NOTE
Glucose elevated - A1C added  Lipid profile is high  Recommend Zocor 20 mg daily  If willing pls let me know.    Yoana Shook [facility]  [phone]

## 2020-01-15 NOTE — NURSING NOTE
"Chief Complaint   Patient presents with     Hypertension     Gastrophageal Reflux       Initial /74 (BP Location: Left arm, Patient Position: Sitting, Cuff Size: Adult Regular)   Pulse 81   Temp 99.2  F (37.3  C) (Tympanic)   Wt 93 kg (205 lb)   SpO2 97%   BMI 38.73 kg/m   Estimated body mass index is 38.73 kg/m  as calculated from the following:    Height as of 12/30/19: 1.549 m (5' 1\").    Weight as of this encounter: 93 kg (205 lb).  Medication Reconciliation: complete  Ashley A. Lechevalier, LPN  "

## 2020-01-15 NOTE — PATIENT INSTRUCTIONS
Assessment & Plan     1. Benign essential hypertension  - Lipid Profile  - Comprehensive metabolic panel (BMP + Alb, Alk Phos, ALT, AST, Total. Bili, TP)  - UA reflex to Microscopic and Culture - [facility]  - TSH with free T4 reflex  - amLODIPine (NORVASC) 10 MG tablet; Take 1 tablet (10 mg) by mouth daily  Dispense: 90 tablet; Refill: 1  - losartan (COZAAR) 100 MG tablet; Take 1 tablet (100 mg) by mouth daily  Dispense: 90 tablet; Refill: 1  - hydrochlorothiazide (HYDRODIURIL) 12.5 MG tablet; Take 1 tablet (12.5 mg) by mouth daily  Dispense: 90 tablet; Refill: 1  - metoprolol succinate ER (TOPROL-XL) 50 MG 24 hr tablet; Take 1 tablet (50 mg) by mouth daily  Dispense: 90 tablet; Refill: 1  - Urine Microscopic    2. Gastroesophageal reflux disease without esophagitis  - Follow-up as needed    3. Encounter for screening mammogram for breast cancer  - MA Screening Digital Bilateral; Future         Please schedule a pap when you set up your next appointment      Return in about 6 months (around 7/15/2020).         Yoana Shook, CNP  [facility]

## 2020-01-16 ASSESSMENT — ANXIETY QUESTIONNAIRES: GAD7 TOTAL SCORE: 3

## 2020-01-22 ENCOUNTER — ANCILLARY PROCEDURE (OUTPATIENT)
Dept: MAMMOGRAPHY | Facility: OTHER | Age: 65
End: 2020-01-22
Attending: NURSE PRACTITIONER
Payer: COMMERCIAL

## 2020-01-22 DIAGNOSIS — Z12.31 ENCOUNTER FOR SCREENING MAMMOGRAM FOR BREAST CANCER: ICD-10-CM

## 2020-01-22 PROCEDURE — 77067 SCR MAMMO BI INCL CAD: CPT | Mod: TC

## 2020-07-21 DIAGNOSIS — I10 BENIGN ESSENTIAL HYPERTENSION: ICD-10-CM

## 2020-07-22 RX ORDER — AMLODIPINE BESYLATE 10 MG/1
TABLET ORAL
Qty: 90 TABLET | Refills: 1 | Status: SHIPPED | OUTPATIENT
Start: 2020-07-22 | End: 2020-09-13

## 2020-08-12 NOTE — PROGRESS NOTES
Dominga Salazar is a 65 year old female who presents to clinic today for the following health issues:        Hypertension Follow-up    Do you check your blood pressure regularly outside of the clinic? No     Are you following a low salt diet? Yes    Are your blood pressures ever more than 140 on the top number (systolic) OR more   than 90 on the bottom number (diastolic), for example 140/90? No          How many servings of fruits and vegetables do you eat daily?  0-1    On average, how many sweetened beverages do you drink each day (Examples: soda, juice, sweet tea, etc.  Do NOT count diet or artificially sweetened beverages)?   0    How many days per week do you exercise enough to make your heart beat faster? 3 or less    How many minutes a day do you exercise enough to make your heart beat faster? 30 - 60    How many days per week do you miss taking your medication? 0        GERD/Heartburn    Duration: intermittent    Description (location/character/radiation): reflux    Intensity:  mild    Accompanying signs and symptoms:  food getting stuck: no   nausea/vomiting/blood: no   abdominal pain: no   black/tarry or bloody stools: no :    History (similar episodes/previous evaluation): hx of GERD    Precipitating or alleviating factors:  worse with fatty foods and spicy foods.  current NSAID/Aspirin use: YES- PRN    Therapies tried and outcome: none      Patient Active Problem List   Diagnosis     ACP (advance care planning)     Benign essential hypertension     Gastroesophageal reflux disease without esophagitis     Past Surgical History:   Procedure Laterality Date      SECTION         Social History     Tobacco Use     Smoking status: Never Smoker     Smokeless tobacco: Never Used   Substance Use Topics     Alcohol use: No     Family History   Problem Relation Age of Onset     Hypertension Mother      Obesity Mother      Osteoporosis Mother      Arthritis Mother      Heart Failure Mother       Hypertension Father      Hyperlipidemia Father      Coronary Artery Disease Father         MI     Hypertension Brother      Hyperlipidemia Brother      Hypertension Son            Current Outpatient Medications   Medication Sig Dispense Refill     amLODIPine (NORVASC) 10 MG tablet TAKE 1 TABLET(10 MG) BY MOUTH DAILY 90 tablet 1     aspirin 81 MG EC tablet Take 1 tablet (81 mg) by mouth daily 90 tablet 3     Calcium Carbonate-Vitamin D (CALCIUM 600-D PO) Take 1,600 mg by mouth daily       Cholecalciferol (VITAMIN D3 PO) Take 1,000 Units by mouth daily       hydrochlorothiazide (HYDRODIURIL) 12.5 MG tablet Take 1 tablet (12.5 mg) by mouth daily 90 tablet 1     ibuprofen (ADVIL) 200 MG tablet Take 400 mg by mouth every 6 hours as needed for mild pain       losartan (COZAAR) 100 MG tablet Take 1 tablet (100 mg) by mouth daily 90 tablet 1     MAGNESIUM OXIDE PO Take 250 mg by mouth daily       metoprolol succinate ER (TOPROL-XL) 50 MG 24 hr tablet Take 1 tablet (50 mg) by mouth daily 90 tablet 1       No Known Allergies    Reviewed and updated as needed this visit by Provider         Review of Systems   Constitutional, HEENT, cardiovascular, pulmonary, GI, , musculoskeletal, neuro, skin, endocrine and psych systems are negative, except as otherwise noted.      Objective    /72 (BP Location: Right arm, Patient Position: Chair, Cuff Size: Adult Regular)   Pulse 78   Temp 98.4  F (36.9  C) (Tympanic)   Wt 94.8 kg (209 lb)   SpO2 98%   BMI 39.49 kg/m    Body mass index is 39.49 kg/m .       Physical Exam   GENERAL: healthy, alert and no distress  EYES: Eyes grossly normal to inspection, PERRL and conjunctivae and sclerae normal  HENT: ear canals and TM's normal, nose and mouth without ulcers or lesions  NECK: no adenopathy, no asymmetry, masses, or scars and thyroid normal to palpation  RESP: lungs clear to auscultation - no rales, rhonchi or wheezes  CV: regular rate and rhythm, normal S1 S2, no S3 or S4, no  murmur, click or rub, no peripheral edema and peripheral pulses strong  ABDOMEN: soft, nontender, no hepatosplenomegaly, no masses and bowel sounds normal  MS: no gross musculoskeletal defects noted, no edema  SKIN: no suspicious lesions or rashes  PSYCH: mentation appears normal, affect normal/bright          Assessment & Plan     Benign essential hypertension  - hydrochlorothiazide (HYDRODIURIL) 12.5 MG tablet; Take 1 tablet (12.5 mg) by mouth daily  - losartan (COZAAR) 100 MG tablet; Take 1 tablet (100 mg) by mouth daily  - metoprolol succinate ER (TOPROL-XL) 50 MG 24 hr tablet; Take 1 tablet (50 mg) by mouth daily       Lipids, CMP, and TSH ordered          Return in about 6 months (around 2/19/2021).         Yoana Shook CNP  [facility]

## 2020-08-19 ENCOUNTER — OFFICE VISIT (OUTPATIENT)
Dept: FAMILY MEDICINE | Facility: OTHER | Age: 65
End: 2020-08-19
Attending: NURSE PRACTITIONER
Payer: MEDICARE

## 2020-08-19 VITALS
TEMPERATURE: 98.4 F | HEART RATE: 78 BPM | DIASTOLIC BLOOD PRESSURE: 72 MMHG | BODY MASS INDEX: 39.49 KG/M2 | WEIGHT: 209 LBS | SYSTOLIC BLOOD PRESSURE: 112 MMHG | OXYGEN SATURATION: 98 %

## 2020-08-19 DIAGNOSIS — I10 BENIGN ESSENTIAL HYPERTENSION: ICD-10-CM

## 2020-08-19 LAB
ALBUMIN SERPL-MCNC: 4.2 G/DL (ref 3.4–5)
ALP SERPL-CCNC: 88 U/L (ref 40–150)
ALT SERPL W P-5'-P-CCNC: 33 U/L (ref 0–50)
ANION GAP SERPL CALCULATED.3IONS-SCNC: 8 MMOL/L (ref 3–14)
AST SERPL W P-5'-P-CCNC: 20 U/L (ref 0–45)
BILIRUB SERPL-MCNC: 0.9 MG/DL (ref 0.2–1.3)
BUN SERPL-MCNC: 22 MG/DL (ref 7–30)
CALCIUM SERPL-MCNC: 9.4 MG/DL (ref 8.5–10.1)
CHLORIDE SERPL-SCNC: 102 MMOL/L (ref 94–109)
CHOLEST SERPL-MCNC: 192 MG/DL
CO2 SERPL-SCNC: 26 MMOL/L (ref 20–32)
CREAT SERPL-MCNC: 1.2 MG/DL (ref 0.52–1.04)
GFR SERPL CREATININE-BSD FRML MDRD: 47 ML/MIN/{1.73_M2}
GLUCOSE SERPL-MCNC: 133 MG/DL (ref 70–99)
HDLC SERPL-MCNC: 68 MG/DL
LDLC SERPL CALC-MCNC: 110 MG/DL
NONHDLC SERPL-MCNC: 124 MG/DL
POTASSIUM SERPL-SCNC: 3.9 MMOL/L (ref 3.4–5.3)
PROT SERPL-MCNC: 8.3 G/DL (ref 6.8–8.8)
SODIUM SERPL-SCNC: 136 MMOL/L (ref 133–144)
TRIGL SERPL-MCNC: 72 MG/DL
TSH SERPL DL<=0.005 MIU/L-ACNC: 2.46 MU/L (ref 0.4–4)

## 2020-08-19 PROCEDURE — 90732 PPSV23 VACC 2 YRS+ SUBQ/IM: CPT | Performed by: NURSE PRACTITIONER

## 2020-08-19 PROCEDURE — 99213 OFFICE O/P EST LOW 20 MIN: CPT | Performed by: NURSE PRACTITIONER

## 2020-08-19 PROCEDURE — 84443 ASSAY THYROID STIM HORMONE: CPT | Mod: ZL | Performed by: NURSE PRACTITIONER

## 2020-08-19 PROCEDURE — G0009 ADMIN PNEUMOCOCCAL VACCINE: HCPCS

## 2020-08-19 PROCEDURE — 80061 LIPID PANEL: CPT | Mod: ZL | Performed by: NURSE PRACTITIONER

## 2020-08-19 PROCEDURE — 36415 COLL VENOUS BLD VENIPUNCTURE: CPT | Mod: ZL | Performed by: NURSE PRACTITIONER

## 2020-08-19 PROCEDURE — 90732 PPSV23 VACC 2 YRS+ SUBQ/IM: CPT

## 2020-08-19 PROCEDURE — 80053 COMPREHEN METABOLIC PANEL: CPT | Mod: ZL | Performed by: NURSE PRACTITIONER

## 2020-08-19 PROCEDURE — G0463 HOSPITAL OUTPT CLINIC VISIT: HCPCS | Mod: 25

## 2020-08-19 RX ORDER — HYDROCHLOROTHIAZIDE 12.5 MG/1
12.5 TABLET ORAL DAILY
Qty: 90 TABLET | Refills: 1 | Status: SHIPPED | OUTPATIENT
Start: 2020-08-19 | End: 2021-02-22

## 2020-08-19 RX ORDER — LOSARTAN POTASSIUM 100 MG/1
100 TABLET ORAL DAILY
Qty: 90 TABLET | Refills: 1 | Status: SHIPPED | OUTPATIENT
Start: 2020-08-19 | End: 2021-02-22

## 2020-08-19 RX ORDER — METOPROLOL SUCCINATE 50 MG/1
50 TABLET, EXTENDED RELEASE ORAL DAILY
Qty: 90 TABLET | Refills: 1 | Status: SHIPPED | OUTPATIENT
Start: 2020-08-19 | End: 2021-01-15

## 2020-08-19 NOTE — PATIENT INSTRUCTIONS
Assessment & Plan     Benign essential hypertension  - hydrochlorothiazide (HYDRODIURIL) 12.5 MG tablet; Take 1 tablet (12.5 mg) by mouth daily  - losartan (COZAAR) 100 MG tablet; Take 1 tablet (100 mg) by mouth daily  - metoprolol succinate ER (TOPROL-XL) 50 MG 24 hr tablet; Take 1 tablet (50 mg) by mouth daily       Lipids, CMP, and TSH ordered          Return in about 6 months (around 2/19/2021).         Yoana Shook CNP  [facility]

## 2020-08-21 NOTE — NURSING NOTE
"Chief Complaint   Patient presents with     Hypertension     Gastrophageal Reflux       Initial /72 (BP Location: Right arm, Patient Position: Chair, Cuff Size: Adult Regular)   Pulse 78   Temp 98.4  F (36.9  C) (Tympanic)   Wt 94.8 kg (209 lb)   SpO2 98%   BMI 39.49 kg/m   Estimated body mass index is 39.49 kg/m  as calculated from the following:    Height as of 12/30/19: 1.549 m (5' 1\").    Weight as of this encounter: 94.8 kg (209 lb).  Medication Reconciliation: complete  Keke Torrez LPN    "

## 2020-09-12 DIAGNOSIS — I10 BENIGN ESSENTIAL HYPERTENSION: ICD-10-CM

## 2020-09-12 NOTE — TELEPHONE ENCOUNTER
amlodipine      Last Written Prescription Date:  7/22/20  Last Fill Quantity: 90,   # refills: 1  Last Office Visit: 8/19/20  Future Office visit:       Routing refill request to provider for review/approval because:

## 2020-09-13 RX ORDER — AMLODIPINE BESYLATE 10 MG/1
TABLET ORAL
Qty: 90 TABLET | Refills: 1 | Status: SHIPPED | OUTPATIENT
Start: 2020-09-13 | End: 2021-02-22

## 2020-10-17 DIAGNOSIS — I10 BENIGN ESSENTIAL HYPERTENSION: ICD-10-CM

## 2020-10-20 RX ORDER — METOPROLOL SUCCINATE 50 MG/1
TABLET, EXTENDED RELEASE ORAL
Qty: 90 TABLET | Refills: 1 | OUTPATIENT
Start: 2020-10-20

## 2020-10-20 RX ORDER — LOSARTAN POTASSIUM 100 MG/1
TABLET ORAL
Qty: 90 TABLET | Refills: 1 | OUTPATIENT
Start: 2020-10-20

## 2020-10-20 NOTE — TELEPHONE ENCOUNTER
Angiotensin-II Receptors Uwxzoi59/17/2020 03:18 AM   Normal serum creatinine on file in past 12 months

## 2021-01-15 DIAGNOSIS — I10 BENIGN ESSENTIAL HYPERTENSION: ICD-10-CM

## 2021-01-15 RX ORDER — METOPROLOL SUCCINATE 50 MG/1
TABLET, EXTENDED RELEASE ORAL
Qty: 90 TABLET | Refills: 1 | Status: SHIPPED | OUTPATIENT
Start: 2021-01-15 | End: 2021-02-22

## 2021-01-15 NOTE — TELEPHONE ENCOUNTER
metoprolol succinate ER (TOPROL-XL) 50 MG 24 hr tablet    Last Written Prescription Date:  8/19/20  Last Fill Quantity: 90,   # refills: 1  Last Office Visit: 8/19/20  Future Office visit:    Next 5 appointments (look out 90 days)    Feb 22, 2021 10:30 AM  (Arrive by 10:15 AM)  SHORT with Yoana Shook CNP  [facility] ([facility] ) [address]  [facility] [address]  [phone]           Routing refill request to provider for review/approval

## 2021-02-10 NOTE — PROGRESS NOTES
"    Assessment & Plan     Benign essential hypertension  - amLODIPine (NORVASC) 10 MG tablet; Take 1 tablet (10 mg) by mouth daily  - hydrochlorothiazide (HYDRODIURIL) 12.5 MG tablet; Take 1 tablet (12.5 mg) by mouth daily  - losartan (COZAAR) 100 MG tablet; Take 1 tablet (100 mg) by mouth daily  - metoprolol succinate ER (TOPROL-XL) 50 MG 24 hr tablet; Take 1 tablet (50 mg) by mouth daily  - TSH with free T4 reflex  - Lipid Profile (Chol, Trig, HDL, LDL calc)  - Comprehensive metabolic panel    Gastroesophageal reflux disease without esophagitis  - Follow-up as needed      Return in about 6 months (around 8/22/2021) for Chronic disease management, am fasting.    Yoana Shook, HEMANT  [facility] - [place] Orr is a 65 year old who presents for the following health issues       Hypertension Follow-up    Do you check your blood pressure regularly outside of the clinic? No     Are you following a low salt diet? Yes    Are your blood pressures ever more than 140 on the top number (systolic) OR more   than 90 on the bottom number (diastolic), for example 140/90? No        How many servings of fruits and vegetables do you eat daily?  2-3    On average, how many sweetened beverages do you drink each day (Examples: soda, juice, sweet tea, etc.  Do NOT count diet or artificially sweetened beverages)?   0    How many days per week do you exercise enough to make your heart beat faster? 3 or less    How many minutes a day do you exercise enough to make your heart beat faster? 20 - 29    How many days per week do you miss taking your medication? 0      GERD/Heartburn- stable no medication needed        Review of Systems   Constitutional, HEENT, cardiovascular, pulmonary, GI, , musculoskeletal, neuro, skin, endocrine and psych systems are negative, except as otherwise noted.        Objective    /76   Pulse 84   Temp 98.9  F (37.2  C) (Tympanic)   Ht 1.562 m (5' 1.5\")   Wt 98.6 kg (217 lb 6.4 oz)   " SpO2 98%   BMI 40.41 kg/m    Body mass index is 40.41 kg/m .       Physical Exam   GENERAL: healthy, alert and no distress  EYES: Eyes grossly normal to inspection, PERRL and conjunctivae and sclerae normal  HENT: ear canals and TM's normal, nose and mouth without ulcers or lesions  NECK: no adenopathy, no asymmetry, masses, or scars and thyroid normal to palpation  RESP: lungs clear to auscultation - no rales, rhonchi or wheezes  CV: regular rate and rhythm, normal S1 S2, no S3 or S4, no murmur, click or rub, no peripheral edema and peripheral pulses strong  MS: no gross musculoskeletal defects noted, no edema  SKIN: no suspicious lesions or rashes  PSYCH: mentation appears normal, affect normal/bright

## 2021-02-22 ENCOUNTER — OFFICE VISIT (OUTPATIENT)
Dept: FAMILY MEDICINE | Facility: OTHER | Age: 66
End: 2021-02-22
Attending: NURSE PRACTITIONER
Payer: COMMERCIAL

## 2021-02-22 VITALS
BODY MASS INDEX: 40.01 KG/M2 | OXYGEN SATURATION: 98 % | SYSTOLIC BLOOD PRESSURE: 128 MMHG | DIASTOLIC BLOOD PRESSURE: 76 MMHG | TEMPERATURE: 98.9 F | HEART RATE: 84 BPM | WEIGHT: 217.4 LBS | HEIGHT: 62 IN

## 2021-02-22 DIAGNOSIS — K21.9 GASTROESOPHAGEAL REFLUX DISEASE WITHOUT ESOPHAGITIS: ICD-10-CM

## 2021-02-22 DIAGNOSIS — I10 BENIGN ESSENTIAL HYPERTENSION: Primary | ICD-10-CM

## 2021-02-22 LAB
ALBUMIN SERPL-MCNC: 4.1 G/DL (ref 3.4–5)
ALP SERPL-CCNC: 100 U/L (ref 40–150)
ALT SERPL W P-5'-P-CCNC: 42 U/L (ref 0–50)
ANION GAP SERPL CALCULATED.3IONS-SCNC: 9 MMOL/L (ref 3–14)
AST SERPL W P-5'-P-CCNC: 22 U/L (ref 0–45)
BILIRUB SERPL-MCNC: 0.9 MG/DL (ref 0.2–1.3)
BUN SERPL-MCNC: 21 MG/DL (ref 7–30)
CALCIUM SERPL-MCNC: 10 MG/DL (ref 8.5–10.1)
CHLORIDE SERPL-SCNC: 103 MMOL/L (ref 94–109)
CHOLEST SERPL-MCNC: 202 MG/DL
CO2 SERPL-SCNC: 23 MMOL/L (ref 20–32)
CREAT SERPL-MCNC: 1.49 MG/DL (ref 0.52–1.04)
GFR SERPL CREATININE-BSD FRML MDRD: 36 ML/MIN/{1.73_M2}
GLUCOSE SERPL-MCNC: 130 MG/DL (ref 70–99)
HDLC SERPL-MCNC: 67 MG/DL
LDLC SERPL CALC-MCNC: 118 MG/DL
NONHDLC SERPL-MCNC: 135 MG/DL
POTASSIUM SERPL-SCNC: 3.9 MMOL/L (ref 3.4–5.3)
PROT SERPL-MCNC: 8.3 G/DL (ref 6.8–8.8)
SODIUM SERPL-SCNC: 135 MMOL/L (ref 133–144)
TRIGL SERPL-MCNC: 87 MG/DL
TSH SERPL DL<=0.005 MIU/L-ACNC: 3.27 MU/L (ref 0.4–4)

## 2021-02-22 PROCEDURE — 83036 HEMOGLOBIN GLYCOSYLATED A1C: CPT | Mod: ZL | Performed by: NURSE PRACTITIONER

## 2021-02-22 PROCEDURE — 999N001182 HC STATISTIC ESTIMATED AVERAGE GLUCOSE: Performed by: NURSE PRACTITIONER

## 2021-02-22 PROCEDURE — 84443 ASSAY THYROID STIM HORMONE: CPT | Mod: ZL | Performed by: NURSE PRACTITIONER

## 2021-02-22 PROCEDURE — 36415 COLL VENOUS BLD VENIPUNCTURE: CPT | Mod: ZL | Performed by: NURSE PRACTITIONER

## 2021-02-22 PROCEDURE — G0463 HOSPITAL OUTPT CLINIC VISIT: HCPCS

## 2021-02-22 PROCEDURE — 80053 COMPREHEN METABOLIC PANEL: CPT | Mod: ZL | Performed by: NURSE PRACTITIONER

## 2021-02-22 PROCEDURE — 99214 OFFICE O/P EST MOD 30 MIN: CPT | Performed by: NURSE PRACTITIONER

## 2021-02-22 PROCEDURE — 80061 LIPID PANEL: CPT | Mod: ZL | Performed by: NURSE PRACTITIONER

## 2021-02-22 RX ORDER — LOSARTAN POTASSIUM 100 MG/1
100 TABLET ORAL DAILY
Qty: 90 TABLET | Refills: 1 | Status: SHIPPED | OUTPATIENT
Start: 2021-02-22 | End: 2021-04-15

## 2021-02-22 RX ORDER — AMLODIPINE BESYLATE 10 MG/1
10 TABLET ORAL DAILY
Qty: 90 TABLET | Refills: 1 | Status: SHIPPED | OUTPATIENT
Start: 2021-02-22 | End: 2021-06-15

## 2021-02-22 RX ORDER — HYDROCHLOROTHIAZIDE 12.5 MG/1
12.5 TABLET ORAL DAILY
Qty: 90 TABLET | Refills: 1 | Status: SHIPPED | OUTPATIENT
Start: 2021-02-22 | End: 2021-04-16

## 2021-02-22 RX ORDER — LACTOBACILLUS RHAMNOSUS GG 10B CELL
1 CAPSULE ORAL 2 TIMES DAILY
COMMUNITY

## 2021-02-22 RX ORDER — METOPROLOL SUCCINATE 50 MG/1
50 TABLET, EXTENDED RELEASE ORAL DAILY
Qty: 90 TABLET | Refills: 1 | Status: SHIPPED | OUTPATIENT
Start: 2021-02-22 | End: 2021-10-06

## 2021-02-22 ASSESSMENT — ANXIETY QUESTIONNAIRES
7. FEELING AFRAID AS IF SOMETHING AWFUL MIGHT HAPPEN: NOT AT ALL
5. BEING SO RESTLESS THAT IT IS HARD TO SIT STILL: NOT AT ALL
2. NOT BEING ABLE TO STOP OR CONTROL WORRYING: NOT AT ALL
IF YOU CHECKED OFF ANY PROBLEMS ON THIS QUESTIONNAIRE, HOW DIFFICULT HAVE THESE PROBLEMS MADE IT FOR YOU TO DO YOUR WORK, TAKE CARE OF THINGS AT HOME, OR GET ALONG WITH OTHER PEOPLE: NOT DIFFICULT AT ALL
GAD7 TOTAL SCORE: 0
1. FEELING NERVOUS, ANXIOUS, OR ON EDGE: NOT AT ALL
6. BECOMING EASILY ANNOYED OR IRRITABLE: NOT AT ALL
3. WORRYING TOO MUCH ABOUT DIFFERENT THINGS: NOT AT ALL

## 2021-02-22 ASSESSMENT — PATIENT HEALTH QUESTIONNAIRE - PHQ9
5. POOR APPETITE OR OVEREATING: NOT AT ALL
SUM OF ALL RESPONSES TO PHQ QUESTIONS 1-9: 0

## 2021-02-22 ASSESSMENT — MIFFLIN-ST. JEOR: SCORE: 1476.43

## 2021-02-22 ASSESSMENT — PAIN SCALES - GENERAL: PAINLEVEL: NO PAIN (0)

## 2021-02-22 NOTE — NURSING NOTE
"Chief Complaint   Patient presents with     Hypertension       Initial /76   Pulse 84   Temp 98.9  F (37.2  C) (Tympanic)   Ht 1.562 m (5' 1.5\")   Wt 98.6 kg (217 lb 6.4 oz)   SpO2 98%   BMI 40.41 kg/m   Estimated body mass index is 40.41 kg/m  as calculated from the following:    Height as of this encounter: 1.562 m (5' 1.5\").    Weight as of this encounter: 98.6 kg (217 lb 6.4 oz).  Medication Reconciliation: complete  Josey Merino LPN  "

## 2021-02-22 NOTE — PATIENT INSTRUCTIONS
Assessment & Plan     Benign essential hypertension  - amLODIPine (NORVASC) 10 MG tablet; Take 1 tablet (10 mg) by mouth daily  - hydrochlorothiazide (HYDRODIURIL) 12.5 MG tablet; Take 1 tablet (12.5 mg) by mouth daily  - losartan (COZAAR) 100 MG tablet; Take 1 tablet (100 mg) by mouth daily  - metoprolol succinate ER (TOPROL-XL) 50 MG 24 hr tablet; Take 1 tablet (50 mg) by mouth daily  - TSH with free T4 reflex  - Lipid Profile (Chol, Trig, HDL, LDL calc)  - Comprehensive metabolic panel    Gastroesophageal reflux disease without esophagitis  - Follow-up as needed      Return in about 6 months (around 8/22/2021) for Chronic disease management, am fasting.        Yoana Shook CNP  [facility] [place] [facility]

## 2021-02-23 DIAGNOSIS — R73.09 ELEVATED GLUCOSE: Primary | ICD-10-CM

## 2021-02-23 LAB
EST. AVERAGE GLUCOSE BLD GHB EST-MCNC: 105 MG/DL
HBA1C MFR BLD: 5.3 % (ref 0–5.6)

## 2021-02-23 ASSESSMENT — ANXIETY QUESTIONNAIRES: GAD7 TOTAL SCORE: 0

## 2021-02-23 NOTE — RESULT ENCOUNTER NOTE
Glucose high, A1C added  Lipids high, low fat diet, Fish oil 3 per day    The 10-year ASCVD risk score (Bety FERRER Jr., et al., 2013) is: 6.9%    Values used to calculate the score:      Age: 65 years      Sex: Female      Is Non- : No      Diabetic: No      Tobacco smoker: No      Systolic Blood Pressure: 128 mmHg      Is BP treated: Yes      HDL Cholesterol: 67 mg/dL      Total Cholesterol: 202 mg/dL

## 2021-04-15 DIAGNOSIS — I10 BENIGN ESSENTIAL HYPERTENSION: ICD-10-CM

## 2021-04-15 RX ORDER — LOSARTAN POTASSIUM 100 MG/1
TABLET ORAL
Qty: 90 TABLET | Refills: 1 | Status: SHIPPED | OUTPATIENT
Start: 2021-04-15 | End: 2021-04-19

## 2021-04-15 NOTE — TELEPHONE ENCOUNTER
Cozaar 100mg      Last Written Prescription Date:  2/22/21  Last Fill Quantity: 90,   # refills: 1  Last Office Visit: 2/22/21  Future Office visit:       Routing refill request to provider for review/approval because:

## 2021-04-16 DIAGNOSIS — I10 BENIGN ESSENTIAL HYPERTENSION: ICD-10-CM

## 2021-04-16 RX ORDER — HYDROCHLOROTHIAZIDE 12.5 MG/1
TABLET ORAL
Qty: 90 TABLET | Refills: 1 | Status: SHIPPED | OUTPATIENT
Start: 2021-04-16 | End: 2021-04-19

## 2021-04-16 NOTE — TELEPHONE ENCOUNTER
hydrochlorothiazide 12.5      Last Written Prescription Date:  2/22/21  Last Fill Quantity: 90,   # refills: 1  Last Office Visit: 2/22/21  Future Office visit:       Routing refill request to provider for review/approval because:

## 2021-04-19 DIAGNOSIS — I10 BENIGN ESSENTIAL HYPERTENSION: ICD-10-CM

## 2021-04-22 RX ORDER — LOSARTAN POTASSIUM 100 MG/1
100 TABLET ORAL DAILY
Qty: 90 TABLET | Refills: 0 | Status: SHIPPED | OUTPATIENT
Start: 2021-04-22 | End: 2021-10-06

## 2021-04-22 RX ORDER — HYDROCHLOROTHIAZIDE 12.5 MG/1
TABLET ORAL
Qty: 90 TABLET | Refills: 0 | Status: SHIPPED | OUTPATIENT
Start: 2021-04-22 | End: 2021-10-06

## 2021-04-22 NOTE — TELEPHONE ENCOUNTER
Pt of Adelita    Called pt to see what pharmacy.Hyrdodiuril and Losartan just filled 4.15 and  4.16.2021 #90 with 1 refill and sent to [facility].     Last office visit on 2.22.2021.    Needs them to go to [facility] they have switched.     Pended.      Shanique Whaley RN

## 2021-06-12 DIAGNOSIS — I10 BENIGN ESSENTIAL HYPERTENSION: ICD-10-CM

## 2021-06-14 NOTE — TELEPHONE ENCOUNTER
NORVASC      Last Written Prescription Date:  2-  Last Fill Quantity: 90,   # refills: 1  Last Office Visit: 2-  Future Office visit:       Routing refill request to provider for review/approval because:

## 2021-06-15 RX ORDER — AMLODIPINE BESYLATE 10 MG/1
TABLET ORAL
Qty: 90 TABLET | Refills: 1 | Status: SHIPPED | OUTPATIENT
Start: 2021-06-15 | End: 2021-09-04

## 2021-09-04 DIAGNOSIS — I10 BENIGN ESSENTIAL HYPERTENSION: ICD-10-CM

## 2021-09-04 RX ORDER — AMLODIPINE BESYLATE 10 MG/1
TABLET ORAL
Qty: 90 TABLET | Refills: 1 | Status: SHIPPED | OUTPATIENT
Start: 2021-09-04 | End: 2022-03-09

## 2021-10-06 DIAGNOSIS — I10 BENIGN ESSENTIAL HYPERTENSION: ICD-10-CM

## 2021-10-06 RX ORDER — LOSARTAN POTASSIUM 100 MG/1
TABLET ORAL
Qty: 90 TABLET | Refills: 0 | Status: SHIPPED | OUTPATIENT
Start: 2021-10-06 | End: 2022-01-05

## 2021-10-06 RX ORDER — METOPROLOL SUCCINATE 50 MG/1
TABLET, EXTENDED RELEASE ORAL
Qty: 90 TABLET | Refills: 1 | Status: SHIPPED | OUTPATIENT
Start: 2021-10-06 | End: 2022-04-04

## 2021-10-06 RX ORDER — HYDROCHLOROTHIAZIDE 12.5 MG/1
TABLET ORAL
Qty: 90 TABLET | Refills: 0 | Status: SHIPPED | OUTPATIENT
Start: 2021-10-06 | End: 2022-01-05

## 2021-10-06 NOTE — TELEPHONE ENCOUNTER
losartan      Last Written Prescription Date:  4/22/21   Last Fill Quantity: 90,   # refills: 0  Last Office Visit: 2/22/21  Future Office visit:       hydrochlorothiazide 4/22/21 #90

## 2022-01-04 DIAGNOSIS — I10 BENIGN ESSENTIAL HYPERTENSION: ICD-10-CM

## 2022-01-05 RX ORDER — HYDROCHLOROTHIAZIDE 12.5 MG/1
TABLET ORAL
Qty: 90 TABLET | Refills: 0 | Status: SHIPPED | OUTPATIENT
Start: 2022-01-05 | End: 2022-04-04

## 2022-01-05 RX ORDER — LOSARTAN POTASSIUM 100 MG/1
TABLET ORAL
Qty: 90 TABLET | Refills: 0 | Status: SHIPPED | OUTPATIENT
Start: 2022-01-05 | End: 2022-04-04

## 2022-03-08 DIAGNOSIS — I10 BENIGN ESSENTIAL HYPERTENSION: ICD-10-CM

## 2022-03-09 RX ORDER — AMLODIPINE BESYLATE 10 MG/1
TABLET ORAL
Qty: 90 TABLET | Refills: 1 | Status: SHIPPED | OUTPATIENT
Start: 2022-03-09 | End: 2022-09-01

## 2022-03-09 NOTE — TELEPHONE ENCOUNTER
Norvasc      Last Written Prescription Date:  9/4/21  Last Fill Quantity: 90,   # refills: 1  Last Office Visit: 2/22/21  Future Office visit:       Routing refill request to provider for review/approval because:

## 2022-03-31 DIAGNOSIS — I10 BENIGN ESSENTIAL HYPERTENSION: ICD-10-CM

## 2022-03-31 NOTE — LETTER
April 4, 2022      Dominga Salazar  [address] [place] [address]        Dear Dominga,     APPOINTMENT REMINDER:   Our records indicates that it is time for you to be seen for an office visit.     Your current medication request for losartan (COZAAR) 100 MG tablet and hydrochlorothiazide (HYDRODIURIL) 12.5 MG tablet will be approved for one refill but you will need to be seen before any additional refills can be approved.  Taking care of your health is important to us, and ongoing visits with your provider are vital to your care.    We look forward to seeing you in the near future.  You may call our office at [phone] to schedule a visit.     Please disregard this notice if you have already made an appointment.        Sincerely,    Yoana Shook, CNP

## 2022-04-03 DIAGNOSIS — I10 BENIGN ESSENTIAL HYPERTENSION: ICD-10-CM

## 2022-04-04 RX ORDER — HYDROCHLOROTHIAZIDE 12.5 MG/1
TABLET ORAL
Qty: 90 TABLET | Refills: 0 | Status: SHIPPED | OUTPATIENT
Start: 2022-04-04 | End: 2022-07-06

## 2022-04-04 RX ORDER — METOPROLOL SUCCINATE 50 MG/1
TABLET, EXTENDED RELEASE ORAL
Qty: 90 TABLET | Refills: 1 | Status: SHIPPED | OUTPATIENT
Start: 2022-04-04 | End: 2022-09-30

## 2022-04-04 RX ORDER — LOSARTAN POTASSIUM 100 MG/1
TABLET ORAL
Qty: 90 TABLET | Refills: 0 | Status: SHIPPED | OUTPATIENT
Start: 2022-04-04 | End: 2022-07-06

## 2022-04-04 NOTE — TELEPHONE ENCOUNTER
hydrochlorothiazide (HYDRODIURIL) 12.5 MG tablet  Last Written Prescription Date:  1/5/22  Last Fill Quantity: 90,  # refills: 0     losartan (COZAAR) 100 MG tablet  Last Written Prescription Date:  1/5/22  Last Fill Quantity: 90,  # refills: 0     Last office visit: 2/22/21  Future Office Visit:      Routing refill request to provider for review/approval because:  Patient needs to be seen because it has been more than 1 year since last office visit    Recall letter sent.

## 2022-04-04 NOTE — TELEPHONE ENCOUNTER
METOPROLOL SUCC ER 50 MG TAB       Last Written Prescription Date:  10/6/21  Last Fill Quantity: 90,   # refills: 1  Last Office Visit: 1/22/21  Future Office visit:    Next 5 appointments (look out 90 days)    Apr 18, 2022  2:45 PM  (Arrive by 2:30 PM)  SHORT with Yoana Shook CNP  [facility] ([facility] ) [address]  [facility] [address]  [phone]           Routing refill request to provider for review/approval because:    Protocol failed     Blood pressure under 140/90 in past 12 months    NO Recent (12 mo) or future (30 days) visit within the authorizing provider's specialty

## 2022-04-12 NOTE — PROGRESS NOTES
Assessment & Plan        Benign essential hypertension  - Lipid Profile (Chol, Trig, HDL, LDL calc); Future  - Comprehensive metabolic panel; Future  - TSH with free T4 reflex; Future  - [facility] [place] [facility]; Future    Gastroesophageal reflux disease without esophagitis  - TUMS PRN    ACP (advance care planning)  - Discussed        Return in about 6 months (around 10/18/2022).      Yoana Shook, HEMANT  [facility] - [place] Or[place] is a 66 year old who presents for the following health issues       Hypertension Follow-up    Do you check your blood pressure regularly outside of the clinic? No     Are you following a low salt diet? Yes    Are your blood pressures ever more than 140 on the top number (systolic) OR more   than 90 on the bottom number (diastolic), for example 140/90? No        GERD/Heartburn  Onset/Duration: Rare  Description: burning sensation  Intensity: mild  Progression of Symptoms: intermittent  Accompanying Signs & Symptoms:  Does it feel like food gets stuck or trouble swallowing: no  Nausea: no  Vomiting (bloody?): no  Abdominal Pain: no  Black-Tarry stools: no  Bloody stools: no  History:  Previous similar episodes: Yes, when eats fried or spicy food  Previous ulcers: no  Precipitating factors:   Caffeine use: 2 cups of coffee in am   Alcohol use: no  NSAID/Aspirin use: 3 times per week  Tobacco use: no  Worse with fatty foods and spicy foods.  Alleviating factors: None  Therapies tried and outcome:             Lifestyle changes: None            Medications: Tums works well        Patient Active Problem List   Diagnosis     ACP (advance care planning)     Benign essential hypertension     Gastroesophageal reflux disease without esophagitis     Past Surgical History:   Procedure Laterality Date      SECTION         Social History     Tobacco Use     Smoking status: Never Smoker     Smokeless tobacco: Never Used   Substance Use Topics      Alcohol use: No     Family History   Problem Relation Age of Onset     Hypertension Mother      Obesity Mother      Osteoporosis Mother      Arthritis Mother      Heart Failure Mother      Hypertension Father      Hyperlipidemia Father      Coronary Artery Disease Father         MI     Hypertension Brother      Hyperlipidemia Brother      Hypertension Son            Current Outpatient Medications   Medication Sig Dispense Refill     amLODIPine (NORVASC) 10 MG tablet TAKE 1 TABLET BY MOUTH EVERY DAY 90 tablet 1     aspirin 81 MG EC tablet Take 1 tablet (81 mg) by mouth daily 90 tablet 3     Calcium Carbonate-Vitamin D (CALCIUM 600-D PO) Take 1,600 mg by mouth daily       Cholecalciferol (VITAMIN D3 PO) Take 1,000 Units by mouth daily       hydrochlorothiazide (HYDRODIURIL) 12.5 MG tablet TAKE 1 TABLET BY MOUTH EVERY DAY 90 tablet 0     ibuprofen (ADVIL/MOTRIN) 200 MG tablet Take 400 mg by mouth every 6 hours as needed for mild pain       lactobacillus rhamnosus, GG, (CULTURELL) capsule Take 1 capsule by mouth 2 times daily       losartan (COZAAR) 100 MG tablet TAKE 1 TABLET BY MOUTH EVERY DAY 90 tablet 0     MAGNESIUM OXIDE PO Take 250 mg by mouth daily       metoprolol succinate ER (TOPROL-XL) 50 MG 24 hr tablet TAKE 1 TABLET BY MOUTH EVERY DAY 90 tablet 1       No Known Allergies       Recent Labs   Lab Test 02/22/21  1047 08/19/20  1033 01/15/20  0946 12/18/17  0849 06/27/16  1044   A1C 5.3  --  5.8*  --  5.3   * 110* 133*  --  108*   HDL 67 68 70  --  73   TRIG 87 72 87  --  81   ALT 42 33 48   < > 21   CR 1.49* 1.20* 1.28*   < > 1.04   GFRESTIMATED 36* 47* 44*   < > 54*   GFRESTBLACK 42* 55* 51*   < > 65   POTASSIUM 3.9 3.9 3.8   < > 3.6   TSH 3.27 2.46 3.66   < >  --     < > = values in this interval not displayed.        BP Readings from Last 3 Encounters:   04/18/22 128/76   02/22/21 128/76   08/19/20 112/72    Wt Readings from Last 3 Encounters:   04/18/22 98.4 kg (217 lb)   02/22/21 98.6 kg (217  "lb 6.4 oz)   08/19/20 94.8 kg (209 lb)              Review of Systems   Constitutional, HEENT, cardiovascular, pulmonary, gi and gu systems are negative, except as otherwise noted.        Objective    /76 (BP Location: Left arm, Patient Position: Sitting, Cuff Size: Adult Large)   Pulse 76   Temp 98.2  F (36.8  C) (Tympanic)   Resp 20   Ht 1.562 m (5' 1.5\")   Wt 98.4 kg (217 lb)   SpO2 98%   BMI 40.34 kg/m    Body mass index is 40.34 kg/m .         Physical Exam   GENERAL: healthy, alert and no distress  EYES: Eyes grossly normal to inspection, PERRL and conjunctivae and sclerae normal  NECK: no adenopathy, no asymmetry, masses, or scars and thyroid normal to palpation  RESP: lungs clear to auscultation - no rales, rhonchi or wheezes  CV: regular rate and rhythm, normal S1 S2, no S3 or S4, no murmur, click or rub, no peripheral edema and peripheral pulses strong  ABDOMEN: soft, nontender, no hepatosplenomegaly, no masses and bowel sounds normal  MS: no gross musculoskeletal defects noted, no edema  NEURO: Normal strength and tone, mentation intact and speech normal  PSYCH: mentation appears normal, affect normal/bright        Patient is seen in conjunction with NP student.  History is reviewed with patient and pertinent portions of the exam are repeated.  Assessment and plan is reviewed with the patient.        "

## 2022-04-18 ENCOUNTER — OFFICE VISIT (OUTPATIENT)
Dept: FAMILY MEDICINE | Facility: OTHER | Age: 67
End: 2022-04-18
Attending: NURSE PRACTITIONER
Payer: MEDICARE

## 2022-04-18 VITALS
TEMPERATURE: 98.2 F | WEIGHT: 217 LBS | DIASTOLIC BLOOD PRESSURE: 76 MMHG | HEART RATE: 76 BPM | OXYGEN SATURATION: 98 % | SYSTOLIC BLOOD PRESSURE: 128 MMHG | RESPIRATION RATE: 20 BRPM | BODY MASS INDEX: 39.93 KG/M2 | HEIGHT: 62 IN

## 2022-04-18 DIAGNOSIS — K21.9 GASTROESOPHAGEAL REFLUX DISEASE WITHOUT ESOPHAGITIS: ICD-10-CM

## 2022-04-18 DIAGNOSIS — I10 BENIGN ESSENTIAL HYPERTENSION: Primary | ICD-10-CM

## 2022-04-18 DIAGNOSIS — Z71.89 ACP (ADVANCE CARE PLANNING): Chronic | ICD-10-CM

## 2022-04-18 LAB
ALBUMIN UR-MCNC: NEGATIVE MG/DL
APPEARANCE UR: CLEAR
BILIRUB UR QL STRIP: NEGATIVE
COLOR UR AUTO: YELLOW
GLUCOSE UR STRIP-MCNC: NEGATIVE MG/DL
HGB UR QL STRIP: NEGATIVE
HOLD SPECIMEN: NORMAL
KETONES UR STRIP-MCNC: NEGATIVE MG/DL
LEUKOCYTE ESTERASE UR QL STRIP: ABNORMAL
NITRATE UR QL: NEGATIVE
PH UR STRIP: 6 [PH] (ref 5–7)
RBC #/AREA URNS AUTO: NORMAL /HPF
SP GR UR STRIP: 1.01 (ref 1–1.03)
UROBILINOGEN UR STRIP-ACNC: 0.2 E.U./DL
WBC #/AREA URNS AUTO: NORMAL /HPF

## 2022-04-18 PROCEDURE — 80053 COMPREHEN METABOLIC PANEL: CPT | Mod: ZL | Performed by: NURSE PRACTITIONER

## 2022-04-18 PROCEDURE — G0463 HOSPITAL OUTPT CLINIC VISIT: HCPCS

## 2022-04-18 PROCEDURE — 84443 ASSAY THYROID STIM HORMONE: CPT | Mod: ZL | Performed by: NURSE PRACTITIONER

## 2022-04-18 PROCEDURE — 80061 LIPID PANEL: CPT | Mod: ZL | Performed by: NURSE PRACTITIONER

## 2022-04-18 PROCEDURE — 36415 COLL VENOUS BLD VENIPUNCTURE: CPT | Mod: ZL | Performed by: NURSE PRACTITIONER

## 2022-04-18 PROCEDURE — 81003 URINALYSIS AUTO W/O SCOPE: CPT | Mod: ZL | Performed by: NURSE PRACTITIONER

## 2022-04-18 PROCEDURE — 81001 URINALYSIS AUTO W/SCOPE: CPT | Mod: ZL | Performed by: NURSE PRACTITIONER

## 2022-04-18 PROCEDURE — 99213 OFFICE O/P EST LOW 20 MIN: CPT | Performed by: NURSE PRACTITIONER

## 2022-04-18 ASSESSMENT — ANXIETY QUESTIONNAIRES
3. WORRYING TOO MUCH ABOUT DIFFERENT THINGS: NOT AT ALL
GAD7 TOTAL SCORE: 0
6. BECOMING EASILY ANNOYED OR IRRITABLE: NOT AT ALL
5. BEING SO RESTLESS THAT IT IS HARD TO SIT STILL: NOT AT ALL
7. FEELING AFRAID AS IF SOMETHING AWFUL MIGHT HAPPEN: NOT AT ALL
2. NOT BEING ABLE TO STOP OR CONTROL WORRYING: NOT AT ALL
1. FEELING NERVOUS, ANXIOUS, OR ON EDGE: NOT AT ALL

## 2022-04-18 ASSESSMENT — PATIENT HEALTH QUESTIONNAIRE - PHQ9
SUM OF ALL RESPONSES TO PHQ QUESTIONS 1-9: 0
5. POOR APPETITE OR OVEREATING: NOT AT ALL

## 2022-04-18 ASSESSMENT — PAIN SCALES - GENERAL: PAINLEVEL: NO PAIN (0)

## 2022-04-18 NOTE — NURSING NOTE
"Chief Complaint   Patient presents with     Hypertension     Gerd         Initial /76 (BP Location: Left arm, Patient Position: Sitting, Cuff Size: Adult Large)   Pulse 76   Temp 98.2  F (36.8  C) (Tympanic)   Resp 20   Ht 1.562 m (5' 1.5\")   Wt 98.4 kg (217 lb)   SpO2 98%   BMI 40.34 kg/m   Estimated body mass index is 40.34 kg/m  as calculated from the following:    Height as of this encounter: 1.562 m (5' 1.5\").    Weight as of this encounter: 98.4 kg (217 lb).  Medication Reconciliation: complete  Charlene Humphrey LPN    "

## 2022-04-18 NOTE — PATIENT INSTRUCTIONS
Assessment & Plan          Benign essential hypertension  - Lipid Profile (Chol, Trig, HDL, LDL calc); Future  - Comprehensive metabolic panel; Future  - TSH with free T4 reflex; Future  - UA reflex to Microscopic and Culture - [place] [facility]; Future    Gastroesophageal reflux disease without esophagitis  - TUMS PRN    ACP (advance care planning)  - Discussed        Return in about 6 months (around 10/18/2022).      Yoana Shook, HEMANT  [facility] [place] [facility]

## 2022-04-19 LAB
ALBUMIN SERPL-MCNC: 3.9 G/DL (ref 3.4–5)
ALP SERPL-CCNC: 87 U/L (ref 40–150)
ALT SERPL W P-5'-P-CCNC: 36 U/L (ref 0–50)
ANION GAP SERPL CALCULATED.3IONS-SCNC: 8 MMOL/L (ref 3–14)
AST SERPL W P-5'-P-CCNC: 20 U/L (ref 0–45)
BILIRUB SERPL-MCNC: 1 MG/DL (ref 0.2–1.3)
BUN SERPL-MCNC: 19 MG/DL (ref 7–30)
CALCIUM SERPL-MCNC: 9.3 MG/DL (ref 8.5–10.1)
CHLORIDE BLD-SCNC: 101 MMOL/L (ref 94–109)
CHOLEST SERPL-MCNC: 212 MG/DL
CO2 SERPL-SCNC: 24 MMOL/L (ref 20–32)
CREAT SERPL-MCNC: 1.15 MG/DL (ref 0.52–1.04)
FASTING STATUS PATIENT QL REPORTED: YES
GFR SERPL CREATININE-BSD FRML MDRD: 52 ML/MIN/1.73M2
GLUCOSE BLD-MCNC: 104 MG/DL (ref 70–99)
HDLC SERPL-MCNC: 71 MG/DL
LDLC SERPL CALC-MCNC: 125 MG/DL
NONHDLC SERPL-MCNC: 141 MG/DL
POTASSIUM BLD-SCNC: 3.7 MMOL/L (ref 3.4–5.3)
PROT SERPL-MCNC: 8.1 G/DL (ref 6.8–8.8)
SODIUM SERPL-SCNC: 133 MMOL/L (ref 133–144)
TRIGL SERPL-MCNC: 78 MG/DL
TSH SERPL DL<=0.005 MIU/L-ACNC: 2.3 MU/L (ref 0.4–4)

## 2022-04-19 ASSESSMENT — ANXIETY QUESTIONNAIRES: GAD7 TOTAL SCORE: 0

## 2022-06-29 ENCOUNTER — NURSE TRIAGE (OUTPATIENT)
Dept: FAMILY MEDICINE | Facility: OTHER | Age: 67
End: 2022-06-29

## 2022-06-29 ENCOUNTER — OFFICE VISIT (OUTPATIENT)
Dept: FAMILY MEDICINE | Facility: OTHER | Age: 67
End: 2022-06-29
Attending: NURSE PRACTITIONER
Payer: COMMERCIAL

## 2022-06-29 VITALS
RESPIRATION RATE: 18 BRPM | WEIGHT: 206.9 LBS | SYSTOLIC BLOOD PRESSURE: 122 MMHG | TEMPERATURE: 99 F | OXYGEN SATURATION: 96 % | HEART RATE: 88 BPM | BODY MASS INDEX: 38.46 KG/M2 | DIASTOLIC BLOOD PRESSURE: 68 MMHG

## 2022-06-29 DIAGNOSIS — H10.33 ACUTE BACTERIAL CONJUNCTIVITIS OF BOTH EYES: ICD-10-CM

## 2022-06-29 DIAGNOSIS — R05.9 COUGH: ICD-10-CM

## 2022-06-29 DIAGNOSIS — J01.00 SUBACUTE MAXILLARY SINUSITIS: Primary | ICD-10-CM

## 2022-06-29 PROBLEM — E66.01 MORBID OBESITY (H): Status: ACTIVE | Noted: 2022-06-29

## 2022-06-29 PROCEDURE — 99213 OFFICE O/P EST LOW 20 MIN: CPT | Performed by: NURSE PRACTITIONER

## 2022-06-29 PROCEDURE — G0463 HOSPITAL OUTPT CLINIC VISIT: HCPCS

## 2022-06-29 RX ORDER — BENZONATATE 200 MG/1
200 CAPSULE ORAL 3 TIMES DAILY PRN
Qty: 30 CAPSULE | Refills: 0 | Status: SHIPPED | OUTPATIENT
Start: 2022-06-29 | End: 2022-07-09

## 2022-06-29 RX ORDER — SULFACETAMIDE SODIUM 100 MG/ML
1-2 SOLUTION/ DROPS OPHTHALMIC
Qty: 5 ML | Refills: 0 | Status: SHIPPED | OUTPATIENT
Start: 2022-06-29 | End: 2022-07-04

## 2022-06-29 RX ORDER — CEFPROZIL 500 MG/1
500 TABLET, FILM COATED ORAL 2 TIMES DAILY
Qty: 20 TABLET | Refills: 0 | Status: SHIPPED | OUTPATIENT
Start: 2022-06-29 | End: 2022-07-09

## 2022-06-29 ASSESSMENT — PAIN SCALES - GENERAL: PAINLEVEL: NO PAIN (0)

## 2022-06-29 NOTE — NURSING NOTE
"Chief Complaint   Patient presents with     Nasal Congestion       Initial /68 (BP Location: Left arm, Patient Position: Sitting, Cuff Size: Adult Large)   Pulse 88   Temp 99  F (37.2  C) (Tympanic)   Resp 18   Wt 93.8 kg (206 lb 14.4 oz)   SpO2 96%   BMI 38.46 kg/m   Estimated body mass index is 38.46 kg/m  as calculated from the following:    Height as of 4/18/22: 1.562 m (5' 1.5\").    Weight as of this encounter: 93.8 kg (206 lb 14.4 oz).  Medication Reconciliation: complete  Hillary Ramirez LPN  "

## 2022-06-29 NOTE — PROGRESS NOTES
Assessment & Plan        Subacute maxillary sinusitis  - cefPROZIL (CEFZIL) 500 MG tablet; Take 1 tablet (500 mg) by mouth 2 times daily for 10 days    Cough  - benzonatate (TESSALON) 200 MG capsule; Take 1 capsule (200 mg) by mouth 3 times daily as needed for cough    Acute bacterial conjunctivitis of both eyes  - sulfacetamide (BLEPH-10) 10 % ophthalmic solution; Apply 1-2 drops to eye every 4 hours (while awake) for 5 days        Insure adequate fluid intake  Get plenty of rest  Monitor for temp at home, treat with OTC Tylenol or Ibuprofen per package instruction.  Please return in you do not improve  To UC or ER with persistent, worsening, or concerning symptoms      Yoana Shook CNP  [facility] - [place] Orr is a 66 year old, presenting for the following health issues:  Nasal Congestion        Acute Illness  Acute illness concerns: head cold  Onset/Duration: 1 week  Symptoms:  Fever: no  Chills/Sweats: no  Headache (location?): YES  Sinus Pressure: YES  Conjunctivitis:  YES  Ear Pain: no  Rhinorrhea: YES  Congestion: YES  Sore Throat: scratchy throat  Cough: YES-productive of clear sputum  Wheeze: no  Decreased Appetite: YES  Nausea: no  Vomiting: no  Diarrhea: YES  Dysuria/Freq.: no  Dysuria or Hematuria: no  Fatigue/Achiness: YES  Sick/Strep Exposure: YES- son  Therapies tried and outcome: mucinex, Claritin d and robitussin        Patient Active Problem List   Diagnosis     ACP (advance care planning)     Benign essential hypertension     Gastroesophageal reflux disease without esophagitis     Past Surgical History:   Procedure Laterality Date      SECTION         Social History     Tobacco Use     Smoking status: Never Smoker     Smokeless tobacco: Never Used   Substance Use Topics     Alcohol use: No     Family History   Problem Relation Age of Onset     Hypertension Mother      Obesity Mother      Osteoporosis Mother      Arthritis Mother      Heart Failure Mother       Hypertension Father      Hyperlipidemia Father      Coronary Artery Disease Father         MI     Hypertension Brother      Hyperlipidemia Brother      Hypertension Son            Current Outpatient Medications   Medication Sig Dispense Refill     amLODIPine (NORVASC) 10 MG tablet TAKE 1 TABLET BY MOUTH EVERY DAY 90 tablet 1     aspirin 81 MG EC tablet Take 1 tablet (81 mg) by mouth daily 90 tablet 3     Calcium Carbonate-Vitamin D (CALCIUM 600-D PO) Take 1,600 mg by mouth daily       Cholecalciferol (VITAMIN D3 PO) Take 1,000 Units by mouth daily       hydrochlorothiazide (HYDRODIURIL) 12.5 MG tablet TAKE 1 TABLET BY MOUTH EVERY DAY 90 tablet 0     ibuprofen (ADVIL/MOTRIN) 200 MG tablet Take 400 mg by mouth every 6 hours as needed for mild pain       lactobacillus rhamnosus, GG, (CULTURELL) capsule Take 1 capsule by mouth 2 times daily       losartan (COZAAR) 100 MG tablet TAKE 1 TABLET BY MOUTH EVERY DAY 90 tablet 0     MAGNESIUM OXIDE PO Take 250 mg by mouth daily       metoprolol succinate ER (TOPROL-XL) 50 MG 24 hr tablet TAKE 1 TABLET BY MOUTH EVERY DAY 90 tablet 1       No Known Allergies       Recent Labs   Lab Test 04/18/22  1543 02/22/21  1047 08/19/20  1033 01/15/20  0946 12/18/17  0849 06/27/16  1044   A1C  --  5.3  --  5.8*  --  5.3   * 118* 110* 133*  --  108*   HDL 71 67 68 70  --  73   TRIG 78 87 72 87  --  81   ALT 36 42 33 48   < > 21   CR 1.15* 1.49* 1.20* 1.28*   < > 1.04   GFRESTIMATED 52* 36* 47* 44*   < > 54*   GFRESTBLACK  --  42* 55* 51*   < > 65   POTASSIUM 3.7 3.9 3.9 3.8   < > 3.6   TSH 2.30 3.27 2.46 3.66   < >  --     < > = values in this interval not displayed.        BP Readings from Last 3 Encounters:   06/29/22 122/68   04/18/22 128/76   02/22/21 128/76    Wt Readings from Last 3 Encounters:   06/29/22 93.8 kg (206 lb 14.4 oz)   04/18/22 98.4 kg (217 lb)   02/22/21 98.6 kg (217 lb 6.4 oz)                    Review of Systems   Constitutional, HEENT, cardiovascular, pulmonary,  gi and gu systems are negative, except as otherwise noted.        Objective    /68 (BP Location: Left arm, Patient Position: Sitting, Cuff Size: Adult Large)   Pulse 88   Temp 99  F (37.2  C) (Tympanic)   Resp 18   Wt 93.8 kg (206 lb 14.4 oz)   SpO2 96%   BMI 38.46 kg/m    Body mass index is 38.46 kg/m .       Physical Exam   GENERAL: healthy, alert and no distress  EYES: Both eyes - scleral erythema, drainage - yellow  Lower lash line  HENT: sinuses: maxillary, frontal tenderness on both sides, Yellow pND  NECK: no adenopathy, no asymmetry, masses, or scars and thyroid normal to palpation  RESP: lungs clear to auscultation - no rales, rhonchi or wheezes  CV: regular rate and rhythm, normal S1 S2, no S3 or S4, no murmur, click or rub, no peripheral edema and peripheral pulses strong  SKIN: no suspicious lesions or rashes  PSYCH: mentation appears normal, affect normal/bright

## 2022-06-29 NOTE — TELEPHONE ENCOUNTER
"Pt calling and has scratchy throat since last Thursday.Not sore. No fever.Nasal congestion.Little cough.No trouble breathing. Tested herself for Covid at home today and is negative. Scheduled today with PCP.Advised if s/s worsen go to .    Shanique Whaley RN      Reason for Disposition    Cold with no complications    Additional Information    Negative: Severe difficulty breathing (e.g., struggling for each breath, speaks in single words)    Negative: Sounds like a life-threatening emergency to the triager    Negative: Runny nose is caused by pollen or other allergies    Negative: Cough is main symptom    Negative: Severe sore throat    Negative: [1] Fever > 101 F (38.3 C) AND [2] age > 60    Negative: [1] Fever > 100.0 F (37.8 C) AND [2] bedridden (e.g., nursing home patient, CVA, chronic illness, recovering from surgery)    Negative: [1] Fever > 100.0 F (37.8 C) AND [2] diabetes mellitus or weak immune system (e.g., HIV positive, cancer chemo, splenectomy, organ transplant, chronic steroids)    Negative: Fever > 104 F (40 C)    Negative: [1] Difficulty breathing AND [2] not severe AND [3] not from stuffy nose (e.g., not relieved by cleaning out the nose)    Negative: Patient sounds very sick or weak to the triager    Negative: Fever present > 3 days (72 hours)    Negative: [1] Fever returns after gone for over 24 hours AND [2] symptoms worse or not improved    Negative: [1] Sinus pain (not just congestion) AND [2] fever    Negative: Earache    Negative: [1] SEVERE sore throat AND [2] present > 24 hours    Negative: [1] Sinus congestion (pressure, fullness) AND [2] present > 10 days    Negative: [1] Nasal discharge AND [2] present > 10 days    Negative: [1] Using nasal washes and pain medicine > 24 hours AND [2] sinus pain (lower forehead, cheekbone, or eye) persists    Negative: Sores with yellow scabs around the nasal opening    Answer Assessment - Initial Assessment Questions  1. ONSET: \"When did the nasal " "discharge start?\"       Last week along with scratchy throat  2. AMOUNT: \"How much discharge is there?\"       little  3. COUGH: \"Do you have a cough?\" If yes, ask: \"Describe the color of your sputum\" (clear, white, yellow, green)      Yes little cough,not coughing anything up  4. RESPIRATORY DISTRESS: \"Describe your breathing.\"       normal  5. FEVER: \"Do you have a fever?\" If so, ask: \"What is your temperature, how was it measured, and when did it start?\"      no  6. SEVERITY: \"Overall, how bad are you feeling right now?\" (e.g., doesn't interfere with normal activities, staying home from school/work, staying in bed)       tired  7. OTHER SYMPTOMS: \"Do you have any other symptoms?\" (e.g., sore throat, earache, wheezing, vomiting)      Scratchy throat, nasal congestion and crusty eyes,tested herself for covid today at home and was negative  8. PREGNANCY: \"Is there any chance you are pregnant?\" \"When was your last menstrual period?\"     na    Protocols used: COMMON COLD-A-AH      "

## 2022-06-29 NOTE — PATIENT INSTRUCTIONS
Assessment & Plan        Subacute maxillary sinusitis  - cefPROZIL (CEFZIL) 500 MG tablet; Take 1 tablet (500 mg) by mouth 2 times daily for 10 days    Cough  - benzonatate (TESSALON) 200 MG capsule; Take 1 capsule (200 mg) by mouth 3 times daily as needed for cough    Acute bacterial conjunctivitis of both eyes  - sulfacetamide (BLEPH-10) 10 % ophthalmic solution; Apply 1-2 drops to eye every 4 hours (while awake) for 5 days        Insure adequate fluid intake  Get plenty of rest  Monitor for temp at home, treat with OTC Tylenol or Ibuprofen per package instruction.  Please return in you do not improve  To UC or ER with persistent, worsening, or concerning symptoms      Yoana Shook CNP  [facility] [place] [facility]

## 2022-07-04 DIAGNOSIS — I10 BENIGN ESSENTIAL HYPERTENSION: ICD-10-CM

## 2022-07-06 RX ORDER — LOSARTAN POTASSIUM 100 MG/1
TABLET ORAL
Qty: 90 TABLET | Refills: 0 | Status: SHIPPED | OUTPATIENT
Start: 2022-07-06 | End: 2022-10-03

## 2022-07-06 RX ORDER — HYDROCHLOROTHIAZIDE 12.5 MG/1
TABLET ORAL
Qty: 90 TABLET | Refills: 0 | Status: SHIPPED | OUTPATIENT
Start: 2022-07-06 | End: 2022-10-04

## 2022-07-06 NOTE — TELEPHONE ENCOUNTER
Losartan 100mg      Last Written Prescription Date:  4/4/22  Last Fill Quantity: 90,   # refills: 0  Last Office Visit: 6/29/22  Future Office visit:         Hydrochlorothiazide 12.5mg      Last Written Prescription Date:  4/4/22  Last Fill Quantity: 90,   # refills: 0  Last Office Visit: 6/29/22  Future Office visit:         Routing refill requests to provider for review/approval because:  Protocol Failed  Normal serum creatinine on file in past 12 months    Recent Labs   Lab Test 04/18/22  1543   CR 1.15*

## 2022-09-01 DIAGNOSIS — I10 BENIGN ESSENTIAL HYPERTENSION: ICD-10-CM

## 2022-09-01 RX ORDER — AMLODIPINE BESYLATE 10 MG/1
TABLET ORAL
Qty: 90 TABLET | Refills: 0 | Status: SHIPPED | OUTPATIENT
Start: 2022-09-01 | End: 2022-10-18

## 2022-09-01 NOTE — TELEPHONE ENCOUNTER
Norvasc      Last Written Prescription Date:  3/9/22  Last Fill Quantity: 90,   # refills: 1  Last Office Visit: 6/29/22  Future Office visit:    Next 5 appointments (look out 90 days)    Oct 18, 2022 11:30 AM  (Arrive by 11:15 AM)  SHORT with Yoana Shook CNP  [facility] ([facility] ) [address]  [facility] [address]  [phone]           Routing refill request to provider for review/approval because:

## 2022-09-29 DIAGNOSIS — I10 BENIGN ESSENTIAL HYPERTENSION: ICD-10-CM

## 2022-09-30 DIAGNOSIS — I10 BENIGN ESSENTIAL HYPERTENSION: ICD-10-CM

## 2022-09-30 RX ORDER — METOPROLOL SUCCINATE 50 MG/1
TABLET, EXTENDED RELEASE ORAL
Qty: 90 TABLET | Refills: 0 | Status: SHIPPED | OUTPATIENT
Start: 2022-09-30 | End: 2022-10-18

## 2022-09-30 NOTE — TELEPHONE ENCOUNTER
Metoprolol succinate ER      Last Written Prescription Date:  4/4/22  Last Fill Quantity: 90,   # refills: 1  Last Office Visit: 6/29/22  Future Office visit:    Next 5 appointments (look out 90 days)    Oct 18, 2022 11:30 AM  (Arrive by 11:15 AM)  SHORT with Yoana Shook CNP  [facility] ([facility] ) [address]  [facility] [address]  [phone]           Routing refill request to provider for review/approval because:

## 2022-10-03 DIAGNOSIS — I10 BENIGN ESSENTIAL HYPERTENSION: ICD-10-CM

## 2022-10-03 RX ORDER — LOSARTAN POTASSIUM 100 MG/1
TABLET ORAL
Qty: 90 TABLET | Refills: 0 | Status: SHIPPED | OUTPATIENT
Start: 2022-10-03 | End: 2022-10-18

## 2022-10-03 NOTE — TELEPHONE ENCOUNTER
Cozaar       Last Written Prescription Date:  7/6/22  Last Fill Quantity: 90,   # refills: 0  Last Office Visit: 6/29/22  Future Office visit:    Next 5 appointments (look out 90 days)    Oct 18, 2022 11:30 AM  (Arrive by 11:15 AM)  SHORT with Yoana Shook CNP  [facility] ([facility] ) [address]  [facility] [address]  [phone]

## 2022-10-04 RX ORDER — HYDROCHLOROTHIAZIDE 12.5 MG/1
TABLET ORAL
Qty: 90 TABLET | Refills: 0 | Status: SHIPPED | OUTPATIENT
Start: 2022-10-04 | End: 2022-10-18

## 2022-10-04 NOTE — TELEPHONE ENCOUNTER
hydrochlorothiazide      Last Written Prescription Date:  7/6/22  Last Fill Quantity: 90,   # refills: 0  Last Office Visit: 6/29/22  Future Office visit:    Next 5 appointments (look out 90 days)    Oct 18, 2022 11:30 AM  (Arrive by 11:15 AM)  SHORT with Yoana Shook CNP  [facility] ([facility] ) [address]  [facility] [address]  [phone]           Routing refill request to provider for review/approval because:

## 2022-10-18 ENCOUNTER — OFFICE VISIT (OUTPATIENT)
Dept: FAMILY MEDICINE | Facility: OTHER | Age: 67
End: 2022-10-18
Attending: NURSE PRACTITIONER
Payer: MEDICARE

## 2022-10-18 VITALS
SYSTOLIC BLOOD PRESSURE: 128 MMHG | OXYGEN SATURATION: 98 % | HEART RATE: 82 BPM | DIASTOLIC BLOOD PRESSURE: 78 MMHG | BODY MASS INDEX: 38.39 KG/M2 | TEMPERATURE: 97.7 F | WEIGHT: 206.5 LBS | RESPIRATION RATE: 18 BRPM

## 2022-10-18 DIAGNOSIS — Z23 NEED FOR PROPHYLACTIC VACCINATION AND INOCULATION AGAINST INFLUENZA: ICD-10-CM

## 2022-10-18 DIAGNOSIS — Z23 IMMUNIZATION DUE: ICD-10-CM

## 2022-10-18 DIAGNOSIS — I10 BENIGN ESSENTIAL HYPERTENSION: Primary | ICD-10-CM

## 2022-10-18 DIAGNOSIS — M54.41 BILATERAL LOW BACK PAIN WITH RIGHT-SIDED SCIATICA, UNSPECIFIED CHRONICITY: ICD-10-CM

## 2022-10-18 DIAGNOSIS — M54.41 ACUTE RIGHT-SIDED LOW BACK PAIN WITH RIGHT-SIDED SCIATICA: ICD-10-CM

## 2022-10-18 DIAGNOSIS — K21.9 GASTROESOPHAGEAL REFLUX DISEASE WITHOUT ESOPHAGITIS: ICD-10-CM

## 2022-10-18 DIAGNOSIS — Z12.31 ENCOUNTER FOR SCREENING MAMMOGRAM FOR MALIGNANT NEOPLASM OF BREAST: ICD-10-CM

## 2022-10-18 LAB
ALBUMIN SERPL BCG-MCNC: 4.4 G/DL (ref 3.5–5.2)
ALBUMIN UR-MCNC: NEGATIVE MG/DL
ALP SERPL-CCNC: 90 U/L (ref 35–104)
ALT SERPL W P-5'-P-CCNC: 24 U/L (ref 10–35)
ANION GAP SERPL CALCULATED.3IONS-SCNC: 10 MMOL/L (ref 7–15)
APPEARANCE UR: CLEAR
AST SERPL W P-5'-P-CCNC: 21 U/L (ref 10–35)
BILIRUB SERPL-MCNC: 0.8 MG/DL
BILIRUB UR QL STRIP: NEGATIVE
BUN SERPL-MCNC: 20.9 MG/DL (ref 8–23)
CALCIUM SERPL-MCNC: 9.4 MG/DL (ref 8.8–10.2)
CHLORIDE SERPL-SCNC: 101 MMOL/L (ref 98–107)
CHOLEST SERPL-MCNC: 198 MG/DL
COLOR UR AUTO: YELLOW
CREAT SERPL-MCNC: 1.22 MG/DL (ref 0.51–0.95)
DEPRECATED HCO3 PLAS-SCNC: 24 MMOL/L (ref 22–29)
GFR SERPL CREATININE-BSD FRML MDRD: 48 ML/MIN/1.73M2
GLUCOSE SERPL-MCNC: 127 MG/DL (ref 70–99)
GLUCOSE UR STRIP-MCNC: NEGATIVE MG/DL
HDLC SERPL-MCNC: 68 MG/DL
HGB UR QL STRIP: NEGATIVE
HOLD SPECIMEN: NORMAL
KETONES UR STRIP-MCNC: NEGATIVE MG/DL
LDLC SERPL CALC-MCNC: 110 MG/DL
LEUKOCYTE ESTERASE UR QL STRIP: NEGATIVE
NITRATE UR QL: NEGATIVE
NONHDLC SERPL-MCNC: 130 MG/DL
PH UR STRIP: 5.5 [PH] (ref 5–7)
POTASSIUM SERPL-SCNC: 4 MMOL/L (ref 3.4–5.3)
PROT SERPL-MCNC: 7.5 G/DL (ref 6.4–8.3)
SODIUM SERPL-SCNC: 135 MMOL/L (ref 136–145)
SP GR UR STRIP: 1.01 (ref 1–1.03)
TRIGL SERPL-MCNC: 99 MG/DL
TSH SERPL DL<=0.005 MIU/L-ACNC: 2.88 UIU/ML (ref 0.3–4.2)
UROBILINOGEN UR STRIP-ACNC: 0.2 E.U./DL

## 2022-10-18 PROCEDURE — 36415 COLL VENOUS BLD VENIPUNCTURE: CPT | Mod: ZL | Performed by: NURSE PRACTITIONER

## 2022-10-18 PROCEDURE — G0463 HOSPITAL OUTPT CLINIC VISIT: HCPCS | Mod: 25

## 2022-10-18 PROCEDURE — 81003 URINALYSIS AUTO W/O SCOPE: CPT | Mod: ZL | Performed by: NURSE PRACTITIONER

## 2022-10-18 PROCEDURE — 90677 PCV20 VACCINE IM: CPT

## 2022-10-18 PROCEDURE — 84443 ASSAY THYROID STIM HORMONE: CPT | Mod: ZL | Performed by: NURSE PRACTITIONER

## 2022-10-18 PROCEDURE — 80053 COMPREHEN METABOLIC PANEL: CPT | Mod: ZL | Performed by: NURSE PRACTITIONER

## 2022-10-18 PROCEDURE — 80061 LIPID PANEL: CPT | Mod: ZL | Performed by: NURSE PRACTITIONER

## 2022-10-18 PROCEDURE — 99214 OFFICE O/P EST MOD 30 MIN: CPT | Performed by: NURSE PRACTITIONER

## 2022-10-18 PROCEDURE — G0463 HOSPITAL OUTPT CLINIC VISIT: HCPCS

## 2022-10-18 PROCEDURE — G0008 ADMIN INFLUENZA VIRUS VAC: HCPCS

## 2022-10-18 RX ORDER — HYDROCHLOROTHIAZIDE 12.5 MG/1
12.5 TABLET ORAL DAILY
Qty: 90 TABLET | Refills: 1 | Status: SHIPPED | OUTPATIENT
Start: 2022-10-18 | End: 2023-05-17

## 2022-10-18 RX ORDER — AMLODIPINE BESYLATE 10 MG/1
10 TABLET ORAL DAILY
Qty: 90 TABLET | Refills: 1 | Status: SHIPPED | OUTPATIENT
Start: 2022-10-18 | End: 2023-05-08

## 2022-10-18 RX ORDER — LOSARTAN POTASSIUM 100 MG/1
100 TABLET ORAL DAILY
Qty: 90 TABLET | Refills: 1 | Status: SHIPPED | OUTPATIENT
Start: 2022-10-18 | End: 2023-07-05

## 2022-10-18 RX ORDER — METOPROLOL SUCCINATE 50 MG/1
50 TABLET, EXTENDED RELEASE ORAL DAILY
Qty: 90 TABLET | Refills: 1 | Status: SHIPPED | OUTPATIENT
Start: 2022-10-18 | End: 2023-07-05

## 2022-10-18 ASSESSMENT — ENCOUNTER SYMPTOMS
CHILLS: 0
ABDOMINAL PAIN: 0
FEVER: 0
COUGH: 0
SHORTNESS OF BREATH: 0
WEAKNESS: 0
BACK PAIN: 1
DIFFICULTY URINATING: 0

## 2022-10-18 ASSESSMENT — PAIN SCALES - GENERAL: PAINLEVEL: NO PAIN (0)

## 2022-10-18 NOTE — PROGRESS NOTES
Assessment & Plan          Benign essential hypertension  - metoprolol succinate ER (TOPROL XL) 50 MG 24 hr tablet; Take 1 tablet (50 mg) by mouth daily  - losartan (COZAAR) 100 MG tablet; Take 1 tablet (100 mg) by mouth daily  - hydrochlorothiazide (HYDRODIURIL) 12.5 MG tablet; Take 1 tablet (12.5 mg) by mouth daily  - amLODIPine (NORVASC) 10 MG tablet; Take 1 tablet (10 mg) by mouth daily  - Lipid Profile (Chol, Trig, HDL, LDL calc)  - TSH with free T4 reflex  - Comprehensive metabolic panel  - UA reflex to Microscopic and Culture - [place] [facility]      Gastroesophageal reflux disease without esophagitis  - Follow-up as needed      Acute right-sided low back pain with right-sided sciatica  - Physical Therapy Referral; Future  - Pillow for support while sleeping  - OTC anti-inflammatory  - OTC Voltaren Gel      Encounter for screening mammogram for malignant neoplasm of breast  - MA Screen Bilateral w/Hugo; Future      Need for prophylactic vaccination and inoculation against influenza  - INFLUENZA, QUAD, HIGH DOSE, PF, 65YR + (FLUZONE HD)  - ADMIN INFLUENZA (For MEDICARE Patients ONLY) []      Immunization due  - PNEUMOCOCCAL 20 VALENT CONJUGATE (PREVNAR 20)        Return in about 6 months (around 4/18/2023).      Yoana Shook CNP  [facility] [place] Orr is a 67 year old, presenting for the following health issues:  Chronic Disease Management        Hypertension Follow-up    Do you check your blood pressure regularly outside of the clinic? No     Are you following a low salt diet? Yes    Are your blood pressures ever more than 140 on the top number (systolic) OR more   than 90 on the bottom number (diastolic), for example 140/90? No        GERD/Heartburn  Onset/Duration: Prior  Accompanying Signs & Symptoms:  Does it feel like food gets stuck or trouble swallowing: No  Nausea: No  Vomiting (bloody?): No  Abdominal Pain: No  Black-Tarry stools: No  Bloody stools: No  Therapies  "tried and outcome:             Medications: antacids        Low back pain, right sciatica   -started four weeks ago  -woke up one day and could barely get out of bed. Pain decreased once up walking around  -pain has improved to right SI since sleeping on her side with head elevated and a pillow between her legs  -Increased pain to right SI with laying on her back.   -No pain with walking or sitting   -History of chronic low back pain   -Right SI pain is intermittent. Denies pain currently.   -History of \"thick\" feeling toes on bilateral feet for the past six years.       Patient Active Problem List   Diagnosis     ACP (advance care planning)     Benign essential hypertension     Gastroesophageal reflux disease without esophagitis     Morbid obesity (H)     Past Surgical History:   Procedure Laterality Date      SECTION         Social History     Tobacco Use     Smoking status: Never     Smokeless tobacco: Never   Substance Use Topics     Alcohol use: No     Family History   Problem Relation Age of Onset     Hypertension Mother      Obesity Mother      Osteoporosis Mother      Arthritis Mother      Heart Failure Mother      Hypertension Father      Hyperlipidemia Father      Coronary Artery Disease Father         MI     Hypertension Brother      Hyperlipidemia Brother      Hypertension Son              Current Outpatient Medications   Medication Sig Dispense Refill     amLODIPine (NORVASC) 10 MG tablet TAKE 1 TABLET BY MOUTH EVERY DAY 90 tablet 0     aspirin 81 MG EC tablet Take 1 tablet (81 mg) by mouth daily 90 tablet 3     Calcium Carbonate-Vitamin D (CALCIUM 600-D PO) Take 1,600 mg by mouth daily       Cholecalciferol (VITAMIN D3 PO) Take 1,000 Units by mouth daily       hydrochlorothiazide (HYDRODIURIL) 12.5 MG tablet TAKE 1 TABLET BY MOUTH EVERY DAY 90 tablet 0     ibuprofen (ADVIL/MOTRIN) 200 MG tablet Take 400 mg by mouth every 6 hours as needed for mild pain       lactobacillus rhamnosus, GG, " (CULTURELL) capsule Take 1 capsule by mouth 2 times daily       losartan (COZAAR) 100 MG tablet TAKE 1 TABLET BY MOUTH EVERY DAY 90 tablet 0     MAGNESIUM OXIDE PO Take 250 mg by mouth daily       metoprolol succinate ER (TOPROL XL) 50 MG 24 hr tablet TAKE 1 TABLET BY MOUTH EVERY DAY 90 tablet 0         No Known Allergies       Recent Labs   Lab Test 04/18/22  1543 02/22/21  1047 08/19/20  1033 01/15/20  0946 12/18/17  0849 06/27/16  1044   A1C  --  5.3  --  5.8*  --  5.3   * 118* 110* 133*  --  108*   HDL 71 67 68 70  --  73   TRIG 78 87 72 87  --  81   ALT 36 42 33 48   < > 21   CR 1.15* 1.49* 1.20* 1.28*   < > 1.04   GFRESTIMATED 52* 36* 47* 44*   < > 54*   GFRESTBLACK  --  42* 55* 51*   < > 65   POTASSIUM 3.7 3.9 3.9 3.8   < > 3.6   TSH 2.30 3.27 2.46 3.66   < >  --     < > = values in this interval not displayed.          BP Readings from Last 3 Encounters:   10/18/22 128/78   06/29/22 122/68   04/18/22 128/76    Wt Readings from Last 3 Encounters:   10/18/22 93.7 kg (206 lb 8 oz)   06/29/22 93.8 kg (206 lb 14.4 oz)   04/18/22 98.4 kg (217 lb)                  Review of Systems   Constitutional: Negative for chills and fever.   Respiratory: Negative for cough and shortness of breath.    Cardiovascular: Negative for chest pain and peripheral edema.   Gastrointestinal: Negative for abdominal pain.   Genitourinary: Negative for difficulty urinating.   Musculoskeletal: Positive for back pain (chronic back pain- new right SI pain ).   Skin: Negative for rash.   Neurological: Negative for weakness.   Psychiatric/Behavioral: Negative for mood changes.              Objective    /78 (BP Location: Right arm, Patient Position: Sitting, Cuff Size: Adult Large)   Pulse 82   Temp 97.7  F (36.5  C) (Tympanic)   Resp 18   Wt 93.7 kg (206 lb 8 oz)   SpO2 98%   BMI 38.39 kg/m    Body mass index is 38.39 kg/m .           Physical Exam  Constitutional:       General: She is not in acute distress.     Appearance:  She is not ill-appearing.   Cardiovascular:      Rate and Rhythm: Normal rate and regular rhythm.      Heart sounds: No murmur heard.  Pulmonary:      Effort: Pulmonary effort is normal.      Breath sounds: No wheezing.   Musculoskeletal:      Comments:  Right SI    Skin:     General: Skin is warm and dry.   Neurological:      Mental Status: She is alert and oriented to person, place, and time.   Psychiatric:         Mood and Affect: Mood normal.         Thought Content: Thought content normal.          Patient is seen in conjunction with NP student.  History is reviewed with patient and pertinent portions of the exam are repeated.  Assessment and plan is reviewed with the patient.

## 2022-10-18 NOTE — PATIENT INSTRUCTIONS
Assessment & Plan          Benign essential hypertension  - metoprolol succinate ER (TOPROL XL) 50 MG 24 hr tablet; Take 1 tablet (50 mg) by mouth daily  - losartan (COZAAR) 100 MG tablet; Take 1 tablet (100 mg) by mouth daily  - hydrochlorothiazide (HYDRODIURIL) 12.5 MG tablet; Take 1 tablet (12.5 mg) by mouth daily  - amLODIPine (NORVASC) 10 MG tablet; Take 1 tablet (10 mg) by mouth daily  - Lipid Profile (Chol, Trig, HDL, LDL calc)  - TSH with free T4 reflex  - Comprehensive metabolic panel  - UA reflex to Microscopic and Culture - [place] [facility]/[place]      Gastroesophageal reflux disease without esophagitis  - Follow-up as needed      Acute right-sided low back pain with right-sided sciatica  - Physical Therapy Referral; Future  - Pillow for support while sleeping  - OTC anti-inflammatory  - OTC Voltaren Gel      Encounter for screening mammogram for malignant neoplasm of breast  - MA Screen Bilateral w/Hugo; Future      Need for prophylactic vaccination and inoculation against influenza  - INFLUENZA, QUAD, HIGH DOSE, PF, 65YR + (FLUZONE HD)  - ADMIN INFLUENZA (For MEDICARE Patients ONLY) []      Immunization due  - PNEUMOCOCCAL 20 VALENT CONJUGATE (PREVNAR 20)        Return in about 6 months (around 4/18/2023).      Yoana Shook CNP  [facility] [place] [facility]

## 2022-10-18 NOTE — NURSING NOTE
"Chief Complaint   Patient presents with     Chronic Disease Management       Initial /78 (BP Location: Right arm, Patient Position: Sitting, Cuff Size: Adult Large)   Pulse 82   Temp 97.7  F (36.5  C) (Tympanic)   Resp 18   Wt 93.7 kg (206 lb 8 oz)   SpO2 98%   BMI 38.39 kg/m   Estimated body mass index is 38.39 kg/m  as calculated from the following:    Height as of 4/18/22: 1.562 m (5' 1.5\").    Weight as of this encounter: 93.7 kg (206 lb 8 oz).  Medication Reconciliation: complete  Hillary Ramirez LPN  "

## 2022-10-18 NOTE — LETTER
November 7, 2022      Dominga Salazar  [address] [place] [address] [address] [place] [address]        Dear MsDannieMartin,    We are writing to inform you of your test results.    Stable labs       Resulted Orders   Lipid Profile (Chol, Trig, HDL, LDL calc)   Result Value Ref Range    Cholesterol 198 <200 mg/dL    Triglycerides 99 <150 mg/dL    Direct Measure HDL 68 >=50 mg/dL    LDL Cholesterol Calculated 110 (H) <=100 mg/dL    Non HDL Cholesterol 130 (H) <130 mg/dL    Narrative    Cholesterol  Desirable:  <200 mg/dL    Triglycerides  Normal:  Less than 150 mg/dL  Borderline High:  150-199 mg/dL  High:  200-499 mg/dL  Very High:  Greater than or equal to 500 mg/dL    Direct Measure HDL  Female:  Greater than or equal to 50 mg/dL   Male:  Greater than or equal to 40 mg/dL    LDL Cholesterol  Desirable:  <100mg/dL  Above Desirable:  100-129 mg/dL   Borderline High:  130-159 mg/dL   High:  160-189 mg/dL   Very High:  >= 190 mg/dL    Non HDL Cholesterol  Desirable:  130 mg/dL  Above Desirable:  130-159 mg/dL  Borderline High:  160-189 mg/dL  High:  190-219 mg/dL  Very High:  Greater than or equal to 220 mg/dL   TSH with free T4 reflex   Result Value Ref Range    TSH 2.88 0.30 - 4.20 uIU/mL   Comprehensive metabolic panel   Result Value Ref Range    Sodium 135 (L) 136 - 145 mmol/L    Potassium 4.0 3.4 - 5.3 mmol/L    Chloride 101 98 - 107 mmol/L    Carbon Dioxide (CO2) 24 22 - 29 mmol/L    Anion Gap 10 7 - 15 mmol/L    Urea Nitrogen 20.9 8.0 - 23.0 mg/dL    Creatinine 1.22 (H) 0.51 - 0.95 mg/dL    Calcium 9.4 8.8 - 10.2 mg/dL    Glucose 127 (H) 70 - 99 mg/dL    Alkaline Phosphatase 90 35 - 104 U/L    AST 21 10 - 35 U/L    ALT 24 10 - 35 U/L    Protein Total 7.5 6.4 - 8.3 g/dL    Albumin 4.4 3.5 - 5.2 g/dL    Bilirubin Total 0.8 <=1.2 mg/dL    GFR Estimate 48 (L) >60 mL/min/1.73m2      Comment:      Effective December 21, 2021 eGFRcr in adults is calculated using the 2021 CKD-EPI creatinine equation which includes age and gender (Walter et  al., ALISA, DOI: 10.1056/TDLIky7026218)   *UA reflex to Microscopic and Culture - [place] [facility]   Result Value Ref Range    Color Urine Yellow Colorless, Straw, Light Yellow, Yellow    Appearance Urine Clear Clear    Glucose Urine Negative Negative mg/dL    Bilirubin Urine Negative Negative    Ketones Urine Negative Negative mg/dL    Specific Gravity Urine 1.010 1.003 - 1.035    Blood Urine Negative Negative    pH Urine 5.5 5.0 - 7.0    Protein Albumin Urine Negative Negative mg/dL    Urobilinogen Urine 0.2 0.2, 1.0 E.U./dL    Nitrite Urine Negative Negative    Leukocyte Esterase Urine Negative Negative    Narrative    Microscopic not indicated       If you have any questions or concerns, please call the clinic at the number listed above.       Sincerely,      Yoana Shook, CNP

## 2022-10-19 ENCOUNTER — TELEPHONE (OUTPATIENT)
Dept: FAMILY MEDICINE | Facility: OTHER | Age: 67
End: 2022-10-19

## 2022-10-19 NOTE — TELEPHONE ENCOUNTER
Patient called to get lab results. States she was on hold for too long and does not have time to wait. Please call patient at [phone]

## 2022-10-25 ENCOUNTER — HOSPITAL ENCOUNTER (OUTPATIENT)
Dept: PHYSICAL THERAPY | Facility: OTHER | Age: 67
Discharge: HOME | End: 2022-10-25
Attending: NURSE PRACTITIONER | Admitting: NURSE PRACTITIONER
Payer: MEDICARE

## 2022-10-25 DIAGNOSIS — M54.41 BILATERAL LOW BACK PAIN WITH RIGHT-SIDED SCIATICA, UNSPECIFIED CHRONICITY: ICD-10-CM

## 2022-10-25 PROCEDURE — 97110 THERAPEUTIC EXERCISES: CPT | Mod: GP

## 2022-10-25 PROCEDURE — 97162 PT EVAL MOD COMPLEX 30 MIN: CPT | Mod: GP

## 2022-10-25 NOTE — PROGRESS NOTES
"   10/25/22 1000   General Information   Type of Visit Initial OP Ortho PT Evaluation   Start of Care Date 10/25/22   Referring Physician Yoana Shook NP   Patient/Family Goals Statement To be able to sleep on back again   Orders Evaluate and Treat   Date of Order 10/18/22   Certification Required? Yes   Medical Diagnosis Bilateral low back pain with right-sided sciatica   Surgical/Medical history reviewed Yes   Precautions/Limitations no known precautions/limitations   General Information Comments Past medical history-see chart for complete listing (hypertension GERD   Body Part(s)   Body Part(s) Lumbar Spine/SI   Presentation and Etiology   Pertinent history of current problem (include personal factors and/or comorbidities that impact the POC) Patient reports a longstanding history of back pain.  She does recall having a MRI which was in 2016.  She was previously seen for an surgical consult with no surgery recommended at that time.  She has tried in the past chiropractic intervention however not since 2016.  She does do a few exercises that she is done on her own.  She states she has quite active and does do quite a bit of lifting etc.  She reports approximately 4 weeks ago she woke in the morning and could \"barely get out of bed \"due to severe pain with pain into the right lower extremity.  She reports this did improve as the day went on.  However she continued to have a few more nights of waking due to pain and especially waking upon getting up in the morning.  She did then try sleeping on the left side-lying position with a pillow between her knees and feels this has helped significantly.  She was seen by her primary care provider and is now referred to physical therapy.   Impairments A. Pain;B. Decreased WB tolerance   Functional Limitations perform activities of daily living;perform desired leisure / sports activities   Symptom Location Right low back/gluteal pain.  Previously she was having sharp pain also " along the right lower extremity to approximately the ankle level.   How/Where did it occur From insidious onset   Chronicity Chronic   Pain rating (0-10 point scale) Best (/10);Worst (/10)   Best (/10) 0   Worst (/10) 9   Pain quality E. Shooting   Frequency of pain/symptoms B. Intermittent   Pain/symptoms are: Worse in the morning   Pain/symptoms exacerbated by G. Certain positions;M. Other;B. Walking;J. ADL   Pain exacerbation comment Sleeping-unable to sleep in the supine position   Pain/symptoms eased by B. Walking;F. 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 Retired   Fall Risk Screen   Fall screen completed by PT   Have you fallen 2 or more times in the past year? No   Have you fallen and had an injury in the past year? No   Is patient a fall risk? No   Abuse Screen (yes response referral indicated)   Feels Unsafe at Home or Work/School no   Feels Threatened by Someone no   Does Anyone Try to Keep You From Having Contact with Others or Doing Things Outside Your Home? no   Physical Signs of Abuse Present no   Patient needs abuse support services and resources No   Lumbar Spine/SI Objective Findings   Gait/Locomotion Nonantalgic   Hamstring Flexibility Mild hypomobility   Quadricep Flexibility Hypomobile   Piriformis Flexibility Significant hypomobility on right   Flexion ROM 0-98   Extension ROM 0-29   Right Side Bending ROM 0-26 w  slight ache on L   Left Side Bending ROM 0-23 w mild ache on R   Repeated Extension-Standing ROM Negative changes   Pelvic Screen negative   Hip Screen Special tests are negative.  She is noted to have decreased hip mobility especially on right with internal and external rotation especially internal rotation   Transversus Abdominus Strength (Reyes Leg Lowering-deg) She is able to withstand minimal to moderate resistance with her trunk extensors and moderate resistance with her trunk flexors   Lumbar/Hip/Knee/Foot Strength Comments She was able to heel and toe walk well   SLR Negative   Prone Instability Test Negative   Crossover SLR Negative   Segmental Mobility Lumbar spine is posteriorly rotated to the left in flexed and extended positions   Palpation Soft tissue tension tenderness especially along the right gluteal and piriformis muscles mild soft tissue tension along the right lumbar and Ql   Slump Test Negative   Observation no acute distress   Posture Forward head, rounded shoulder posture, increased CT kyphosis, left shoulder and iliac crest are elevated as compared to the right   Planned Therapy Interventions   Planned Therapy Interventions ROM;strengthening;stretching;manual therapy;joint mobilization   Planned Modality Interventions   Planned Modality Interventions Comments Modalities as indicated   Clinical Impression   Criteria for Skilled Therapeutic Interventions Met yes, treatment indicated   PT Diagnosis Right low back pain with right lower extremity radiculopathy   Influenced by the following impairments Pain, decreased mobility, decreased strength   Functional limitations due to impairments Sleeping, at times walking in morning and dressing   Clinical Presentation Evolving/Changing   Clinical Presentation Rationale Clinical judgment-progression of pain with now lower extremity symptoms   Clinical Decision Making (Complexity) Low complexity   Therapy Frequency 2 times/Week   Predicted Duration of  Therapy Intervention (days/wks) Up to 6 weeks   Risk & Benefits of therapy have been explained Yes   Patient, Family & other staff in agreement with plan of care Yes   Clinical Impression Comments Patient presents with at this time primarily right low back pain with no occasional right lower extremity symptoms.  She does have significant findings on her MRI from 2016.  Objectively today she is noted to have significant soft tissue restrictions especially in the right piriformis/gluteal muscles   Education Assessment   Barriers to Learning No barriers   ORTHO GOALS   PT Ortho Eval Goals 1;2;3   Ortho Goal 1   Goal Identifier STG 1   Goal Description Patient will begin home stretching program   Target Date 11/01/22   Ortho Goal 2   Goal Identifier LTG 1   Goal Description Patient will demonstrate independence with home exercise program   Target Date 12/06/22   Ortho Goal 3   Goal Identifier LTG #2   Goal Description Patient will be able to sleep throughout the night without waking due to back pain and be able to get out of bed in the morning with minimal to no back pain or leg pain 75% of the time   Target Date 12/06/22   Total Evaluation Time   PT Eval, Moderate Complexity Minutes (28797) 30   Therapy Certification   Certification date from 10/25/22   Certification date to 01/25/23   Medical Diagnosis Bilateral low back pain with right-sided sciatica

## 2022-11-25 DIAGNOSIS — Z12.31 ENCOUNTER FOR SCREENING MAMMOGRAM FOR BREAST CANCER: Primary | ICD-10-CM

## 2022-12-05 ENCOUNTER — TELEPHONE (OUTPATIENT)
Dept: MAMMOGRAPHY | Facility: OTHER | Age: 67
End: 2022-12-05

## 2022-12-05 ENCOUNTER — ANCILLARY PROCEDURE (OUTPATIENT)
Dept: MAMMOGRAPHY | Facility: OTHER | Age: 67
End: 2022-12-05
Attending: NURSE PRACTITIONER
Payer: MEDICARE

## 2022-12-05 DIAGNOSIS — Z12.31 ENCOUNTER FOR SCREENING MAMMOGRAM FOR MALIGNANT NEOPLASM OF BREAST: ICD-10-CM

## 2022-12-05 PROCEDURE — 77067 SCR MAMMO BI INCL CAD: CPT | Mod: TC

## 2023-02-15 NOTE — PROGRESS NOTES
[facility]    Outpatient Physical Therapy Discharge Note  Patient: Dominga Salazar  : 1955    Beginning/End Dates of Reporting Period:  10/25/22 to 11/10/22    Referring Provider: Yoana Shook NP    Therapy Diagnosis: B LBP with R sciatica     Client Self Report: Initial eval completed-see report    Objective Measurements:                                          Outcome Measures (most recent score):      Goals:  Goal Identifier STG 1   Goal Description Patient will begin home stretching program   Target Date 22   Date Met      Progress (detail required for progress note):       Goal Identifier LTG 1   Goal Description Patient will demonstrate independence with home exercise program   Target Date 22   Date Met      Progress (detail required for progress note):       Goal Identifier LTG #2   Goal Description Patient will be able to sleep throughout the night without waking due to back pain and be able to get out of bed in the morning with minimal to no back pain or leg pain 75% of the time   Target Date 22   Date Met      Progress (detail required for progress note):       Goal Identifier     Goal Description     Target Date     Date Met      Progress (detail required for progress note):       Goal Identifier     Goal Description     Target Date     Date Met      Progress (detail required for progress note):       Goal Identifier     Goal Description     Target Date     Date Met      Progress (detail required for progress note):       Goal Identifier     Goal Description     Target Date     Date Met      Progress (detail required for progress note):       Goal Identifier     Goal Description     Target Date     Date Met      Progress (detail required for progress note):             Plan:  Discharge from therapy.    Discharge:    Reason for Discharge: Patient chooses to discontinue  therapy.    Equipment Issued: none    Discharge Plan: Pt was seen for one session and cancelled her follow up appts

## 2023-05-07 DIAGNOSIS — I10 BENIGN ESSENTIAL HYPERTENSION: ICD-10-CM

## 2023-05-08 RX ORDER — AMLODIPINE BESYLATE 10 MG/1
10 TABLET ORAL DAILY
Qty: 90 TABLET | Refills: 1 | Status: SHIPPED | OUTPATIENT
Start: 2023-05-08 | End: 2023-08-02

## 2023-05-08 NOTE — TELEPHONE ENCOUNTER
amLODIPine (NORVASC) 10 MG tablet      Last Written Prescription Date:  10/18/2022  Last Fill Quantity: 90,   # refills: 1  Last Office Visit: 10/18/2022

## 2023-05-16 DIAGNOSIS — I10 BENIGN ESSENTIAL HYPERTENSION: ICD-10-CM

## 2023-05-17 RX ORDER — HYDROCHLOROTHIAZIDE 12.5 MG/1
TABLET ORAL
Qty: 90 TABLET | Refills: 0 | Status: SHIPPED | OUTPATIENT
Start: 2023-05-17 | End: 2023-08-02

## 2023-05-17 NOTE — TELEPHONE ENCOUNTER
[facility]      Last Written Prescription Date:  2.16.23  Last Fill Quantity: #90,   # refills: 0  Last Office Visit: 10.18.22  Future Office visit:       Routing refill request to provider for review/approval because:

## 2023-07-30 DIAGNOSIS — I10 BENIGN ESSENTIAL HYPERTENSION: ICD-10-CM

## 2023-07-31 NOTE — PROGRESS NOTES
Assessment & Plan         Benign essential hypertension  - Comprehensive metabolic panel; Future  - hydrochlorothiazide (HYDRODIURIL) 12.5 MG tablet; Take 1 tablet (12.5 mg) by mouth daily  - amLODIPine (NORVASC) 10 MG tablet; Take 1 tablet (10 mg) by mouth daily  - losartan (COZAAR) 100 MG tablet; Take 1 tablet (100 mg) by mouth daily  - metoprolol succinate ER (TOPROL XL) 50 MG 24 hr tablet; Take 1 tablet (50 mg) by mouth daily  - UA Macroscopic with reflex to Microscopic and Culture; Future      Gastroesophageal reflux disease without esophagitis  - Follow-up as needed      Chronic kidney disease, stage 3a (H)  - Albumin Random Urine Quantitative with Creat Ratio; Future  - CBC with platelets and differential; Future        Return in about 6 months (around 2024).        Yoana Shook [facility] - [place] Orr is a 68 year old, presenting for the following health issues:  Hypertension and Gastrophageal Reflux        Hypertension Follow-up  Do you check your blood pressure regularly outside of the clinic? No   Are you following a low salt diet? Yes  Are your blood pressures ever more than 140 on the top number (systolic) OR more   than 90 on the bottom number (diastolic), for example 140/90? No        GERD/Heartburn  Onset/Duration: prior  Accompanying Signs & Symptoms:  Does it feel like food gets stuck or trouble swallowing: No  Nausea: No  Vomiting (bloody?): No  Abdominal Pain: No  Black-Tarry stools: No  Bloody stools: No  History:  Previous ulcers: No  Therapies tried and outcome:             Medications: antacids        Patient Active Problem List   Diagnosis    ACP (advance care planning)    Benign essential hypertension    Gastroesophageal reflux disease without esophagitis    Morbid obesity (H)    Chronic kidney disease, stage 3a (H)     Past Surgical History:   Procedure Laterality Date     SECTION         Social History     Tobacco Use    Smoking status: Never     Smokeless tobacco: Never   Substance Use Topics    Alcohol use: No     Family History   Problem Relation Age of Onset    Hypertension Mother     Obesity Mother     Osteoporosis Mother     Arthritis Mother     Heart Failure Mother     Hypertension Father     Hyperlipidemia Father     Coronary Artery Disease Father         MI    Hypertension Brother     Hyperlipidemia Brother     Hypertension Son              Current Outpatient Medications   Medication Sig Dispense Refill    amLODIPine (NORVASC) 10 MG tablet Take 1 tablet (10 mg) by mouth daily 90 tablet 1    aspirin 81 MG EC tablet Take 1 tablet (81 mg) by mouth daily 90 tablet 3    Calcium Carbonate-Vitamin D (CALCIUM 600-D PO) Take 1,600 mg by mouth daily      Cholecalciferol (VITAMIN D3 PO) Take 1,000 Units by mouth daily      hydrochlorothiazide (HYDRODIURIL) 12.5 MG tablet Take 1 tablet (12.5 mg) by mouth daily 90 tablet 1    ibuprofen (ADVIL/MOTRIN) 200 MG tablet Take 400 mg by mouth every 6 hours as needed for mild pain      lactobacillus rhamnosus, GG, (CULTURELL) capsule Take 1 capsule by mouth 2 times daily      losartan (COZAAR) 100 MG tablet Take 1 tablet (100 mg) by mouth daily 90 tablet 1    MAGNESIUM OXIDE PO Take 250 mg by mouth daily      metoprolol succinate ER (TOPROL XL) 50 MG 24 hr tablet Take 1 tablet (50 mg) by mouth daily 90 tablet 1         No Known Allergies        Recent Labs   Lab Test 10/18/22  1139 04/18/22  1543 02/22/21  1047 08/19/20  1033 01/15/20  0946 12/18/17  0849 06/27/16  1044   A1C  --   --  5.3  --  5.8*  --  5.3   * 125* 118* 110* 133*  --  108*   HDL 68 71 67 68 70  --  73   TRIG 99 78 87 72 87  --  81   ALT 24 36 42 33 48   < > 21   CR 1.22* 1.15* 1.49* 1.20* 1.28*   < > 1.04   GFRESTIMATED 48* 52* 36* 47* 44*   < > 54*   GFRESTBLACK  --   --  42* 55* 51*   < > 65   POTASSIUM 4.0 3.7 3.9 3.9 3.8   < > 3.6   TSH 2.88 2.30 3.27 2.46 3.66   < >  --     < > = values in this interval not displayed.          BP  Readings from Last 3 Encounters:   08/02/23 119/80   10/18/22 128/78   06/29/22 122/68    Wt Readings from Last 3 Encounters:   08/02/23 92.4 kg (203 lb 11.2 oz)   10/18/22 93.7 kg (206 lb 8 oz)   06/29/22 93.8 kg (206 lb 14.4 oz)                     Review of Systems   CONSTITUTIONAL: NEGATIVE for fever, chills, change in weight  ENT/MOUTH: NEGATIVE for ear, mouth and throat problems  RESP: NEGATIVE for significant cough or SOB  CV: NEGATIVE for chest pain, palpitations or peripheral edema  GI: NEGATIVE for nausea, abdominal pain, heartburn, or change in bowel habits  MUSCULOSKELETAL: NEGATIVE for significant arthralgias or myalgia  PSYCHIATRIC: NEGATIVE for changes in mood or affect  ROS otherwise negative          Objective    /80 (BP Location: Right arm, Patient Position: Sitting, Cuff Size: Adult Large)   Pulse 81   Temp 98  F (36.7  C) (Tympanic)   Resp 17   Wt 92.4 kg (203 lb 11.2 oz)   SpO2 95%   BMI 37.87 kg/m    Body mass index is 37.87 kg/m .          Physical Exam   GENERAL: healthy, alert and no distress  NECK: no adenopathy, no asymmetry, masses, or scars and thyroid normal to palpation  RESP: lungs clear to auscultation - no rales, rhonchi or wheezes  CV: regular rate and rhythm, normal S1 S2, no S3 or S4, no murmur, click or rub, no peripheral edema and peripheral pulses strong  ABDOMEN: soft, nontender, no hepatosplenomegaly, no masses and bowel sounds normal  MS: no gross musculoskeletal defects noted, no edema  NEURO: Normal strength and tone, mentation intact and speech normal  PSYCH: mentation appears normal, affect normal/bright          Patient is seen in conjunction with NP student.  History is reviewed with patient and pertinent portions of the exam are repeated.  Assessment and plan is reviewed with the patient.         Answers submitted by the patient for this visit:  Patient Health Questionnaire (Submitted on 8/2/2023)  PHQ9 TOTAL SCORE: 0  MARTY-7 (Submitted on 8/2/2023)  MARTY 7  TOTAL SCORE: 3

## 2023-08-01 RX ORDER — METOPROLOL SUCCINATE 50 MG/1
TABLET, EXTENDED RELEASE ORAL
Qty: 30 TABLET | Refills: 1 | Status: SHIPPED | OUTPATIENT
Start: 2023-08-01 | End: 2023-08-02

## 2023-08-01 RX ORDER — LOSARTAN POTASSIUM 100 MG/1
TABLET ORAL
Qty: 30 TABLET | Refills: 0 | Status: SHIPPED | OUTPATIENT
Start: 2023-08-01 | End: 2023-08-02

## 2023-08-01 NOTE — TELEPHONE ENCOUNTER
Cozaar      Last Written Prescription Date:  7.5.23  Last Fill Quantity: #30,   # refills: 0  Last Office Visit: 10.18.22  Future Office visit:    Next 5 appointments (look out 90 days)      Aug 02, 2023 11:30 AM  (Arrive by 11:15 AM)  SHORT with Yoana Shook CNP  [facility] ([facility] ) [address]  [facility] [address]  [phone]             Routing refill request to provider for review/approval because:

## 2023-08-02 ENCOUNTER — OFFICE VISIT (OUTPATIENT)
Dept: FAMILY MEDICINE | Facility: OTHER | Age: 68
End: 2023-08-02
Attending: NURSE PRACTITIONER
Payer: COMMERCIAL

## 2023-08-02 VITALS
BODY MASS INDEX: 37.87 KG/M2 | HEART RATE: 81 BPM | WEIGHT: 203.7 LBS | OXYGEN SATURATION: 95 % | SYSTOLIC BLOOD PRESSURE: 119 MMHG | TEMPERATURE: 98 F | DIASTOLIC BLOOD PRESSURE: 80 MMHG | RESPIRATION RATE: 17 BRPM

## 2023-08-02 DIAGNOSIS — K21.9 GASTROESOPHAGEAL REFLUX DISEASE WITHOUT ESOPHAGITIS: ICD-10-CM

## 2023-08-02 DIAGNOSIS — N18.31 CHRONIC KIDNEY DISEASE, STAGE 3A (H): ICD-10-CM

## 2023-08-02 DIAGNOSIS — I10 BENIGN ESSENTIAL HYPERTENSION: Primary | ICD-10-CM

## 2023-08-02 LAB
ALBUMIN SERPL BCG-MCNC: 4.4 G/DL (ref 3.5–5.2)
ALBUMIN UR-MCNC: NEGATIVE MG/DL
ALP SERPL-CCNC: 97 U/L (ref 35–104)
ALT SERPL W P-5'-P-CCNC: 25 U/L (ref 0–50)
ANION GAP SERPL CALCULATED.3IONS-SCNC: 14 MMOL/L (ref 7–15)
APPEARANCE UR: CLEAR
AST SERPL W P-5'-P-CCNC: 19 U/L (ref 0–45)
BACTERIA #/AREA URNS HPF: ABNORMAL /HPF
BASOPHILS # BLD AUTO: 0.1 10E3/UL (ref 0–0.2)
BASOPHILS NFR BLD AUTO: 1 %
BILIRUB SERPL-MCNC: 0.9 MG/DL
BILIRUB UR QL STRIP: NEGATIVE
BUN SERPL-MCNC: 16.7 MG/DL (ref 8–23)
CALCIUM SERPL-MCNC: 10.4 MG/DL (ref 8.8–10.2)
CHLORIDE SERPL-SCNC: 101 MMOL/L (ref 98–107)
COLOR UR AUTO: YELLOW
CREAT SERPL-MCNC: 1.27 MG/DL (ref 0.51–0.95)
CREAT UR-MCNC: 81.1 MG/DL
DEPRECATED HCO3 PLAS-SCNC: 23 MMOL/L (ref 22–29)
EOSINOPHIL # BLD AUTO: 0.2 10E3/UL (ref 0–0.7)
EOSINOPHIL NFR BLD AUTO: 2 %
ERYTHROCYTE [DISTWIDTH] IN BLOOD BY AUTOMATED COUNT: 12.6 % (ref 10–15)
GFR SERPL CREATININE-BSD FRML MDRD: 46 ML/MIN/1.73M2
GLUCOSE SERPL-MCNC: 137 MG/DL (ref 70–99)
GLUCOSE UR STRIP-MCNC: NEGATIVE MG/DL
HCT VFR BLD AUTO: 42.5 % (ref 35–47)
HGB BLD-MCNC: 14.9 G/DL (ref 11.7–15.7)
HGB UR QL STRIP: NEGATIVE
IMM GRANULOCYTES # BLD: 0 10E3/UL
IMM GRANULOCYTES NFR BLD: 0 %
KETONES UR STRIP-MCNC: NEGATIVE MG/DL
LEUKOCYTE ESTERASE UR QL STRIP: ABNORMAL
LYMPHOCYTES # BLD AUTO: 2.2 10E3/UL (ref 0.8–5.3)
LYMPHOCYTES NFR BLD AUTO: 24 %
MCH RBC QN AUTO: 30 PG (ref 26.5–33)
MCHC RBC AUTO-ENTMCNC: 35.1 G/DL (ref 31.5–36.5)
MCV RBC AUTO: 86 FL (ref 78–100)
MICROALBUMIN UR-MCNC: <12 MG/L
MICROALBUMIN/CREAT UR: NORMAL MG/G{CREAT}
MONOCYTES # BLD AUTO: 0.5 10E3/UL (ref 0–1.3)
MONOCYTES NFR BLD AUTO: 6 %
NEUTROPHILS # BLD AUTO: 6.1 10E3/UL (ref 1.6–8.3)
NEUTROPHILS NFR BLD AUTO: 68 %
NITRATE UR QL: NEGATIVE
PH UR STRIP: 7 [PH] (ref 5–7)
PLATELET # BLD AUTO: 269 10E3/UL (ref 150–450)
POTASSIUM SERPL-SCNC: 4.2 MMOL/L (ref 3.4–5.3)
PROT SERPL-MCNC: 7.9 G/DL (ref 6.4–8.3)
RBC # BLD AUTO: 4.96 10E6/UL (ref 3.8–5.2)
RBC #/AREA URNS AUTO: ABNORMAL /HPF
SODIUM SERPL-SCNC: 138 MMOL/L (ref 136–145)
SP GR UR STRIP: <=1.005 (ref 1–1.03)
SQUAMOUS #/AREA URNS AUTO: ABNORMAL /LPF
UROBILINOGEN UR STRIP-ACNC: 0.2 E.U./DL
WBC # BLD AUTO: 9 10E3/UL (ref 4–11)
WBC #/AREA URNS AUTO: ABNORMAL /HPF

## 2023-08-02 PROCEDURE — 85025 COMPLETE CBC W/AUTO DIFF WBC: CPT | Mod: ZL | Performed by: NURSE PRACTITIONER

## 2023-08-02 PROCEDURE — 36415 COLL VENOUS BLD VENIPUNCTURE: CPT | Mod: ZL | Performed by: NURSE PRACTITIONER

## 2023-08-02 PROCEDURE — 99214 OFFICE O/P EST MOD 30 MIN: CPT | Performed by: NURSE PRACTITIONER

## 2023-08-02 PROCEDURE — 82570 ASSAY OF URINE CREATININE: CPT | Mod: ZL | Performed by: NURSE PRACTITIONER

## 2023-08-02 PROCEDURE — 80053 COMPREHEN METABOLIC PANEL: CPT | Mod: ZL | Performed by: NURSE PRACTITIONER

## 2023-08-02 PROCEDURE — 81003 URINALYSIS AUTO W/O SCOPE: CPT | Mod: ZL | Performed by: NURSE PRACTITIONER

## 2023-08-02 PROCEDURE — 81001 URINALYSIS AUTO W/SCOPE: CPT | Mod: ZL | Performed by: NURSE PRACTITIONER

## 2023-08-02 PROCEDURE — G0463 HOSPITAL OUTPT CLINIC VISIT: HCPCS

## 2023-08-02 RX ORDER — LOSARTAN POTASSIUM 100 MG/1
100 TABLET ORAL DAILY
Qty: 90 TABLET | Refills: 1 | Status: SHIPPED | OUTPATIENT
Start: 2023-08-02 | End: 2024-01-24

## 2023-08-02 RX ORDER — AMLODIPINE BESYLATE 10 MG/1
10 TABLET ORAL DAILY
Qty: 90 TABLET | Refills: 1 | Status: SHIPPED | OUTPATIENT
Start: 2023-08-02 | End: 2024-01-31

## 2023-08-02 RX ORDER — HYDROCHLOROTHIAZIDE 12.5 MG/1
12.5 TABLET ORAL DAILY
Qty: 90 TABLET | Refills: 1 | Status: SHIPPED | OUTPATIENT
Start: 2023-08-02 | End: 2024-02-20

## 2023-08-02 RX ORDER — METOPROLOL SUCCINATE 50 MG/1
50 TABLET, EXTENDED RELEASE ORAL DAILY
Qty: 90 TABLET | Refills: 1 | Status: SHIPPED | OUTPATIENT
Start: 2023-08-02 | End: 2024-01-31

## 2023-08-02 ASSESSMENT — PATIENT HEALTH QUESTIONNAIRE - PHQ9
SUM OF ALL RESPONSES TO PHQ QUESTIONS 1-9: 0
SUM OF ALL RESPONSES TO PHQ QUESTIONS 1-9: 0

## 2023-08-02 ASSESSMENT — ANXIETY QUESTIONNAIRES
4. TROUBLE RELAXING: NOT AT ALL
1. FEELING NERVOUS, ANXIOUS, OR ON EDGE: SEVERAL DAYS
IF YOU CHECKED OFF ANY PROBLEMS ON THIS QUESTIONNAIRE, HOW DIFFICULT HAVE THESE PROBLEMS MADE IT FOR YOU TO DO YOUR WORK, TAKE CARE OF THINGS AT HOME, OR GET ALONG WITH OTHER PEOPLE: NOT DIFFICULT AT ALL
5. BEING SO RESTLESS THAT IT IS HARD TO SIT STILL: NOT AT ALL
3. WORRYING TOO MUCH ABOUT DIFFERENT THINGS: NOT AT ALL
4. TROUBLE RELAXING: NOT AT ALL
7. FEELING AFRAID AS IF SOMETHING AWFUL MIGHT HAPPEN: NOT AT ALL
6. BECOMING EASILY ANNOYED OR IRRITABLE: NOT AT ALL
2. NOT BEING ABLE TO STOP OR CONTROL WORRYING: NOT AT ALL
GAD7 TOTAL SCORE: 3
3. WORRYING TOO MUCH ABOUT DIFFERENT THINGS: SEVERAL DAYS
GAD7 TOTAL SCORE: 1
5. BEING SO RESTLESS THAT IT IS HARD TO SIT STILL: NOT AT ALL
6. BECOMING EASILY ANNOYED OR IRRITABLE: NOT AT ALL
7. FEELING AFRAID AS IF SOMETHING AWFUL MIGHT HAPPEN: NOT AT ALL
IF YOU CHECKED OFF ANY PROBLEMS ON THIS QUESTIONNAIRE, HOW DIFFICULT HAVE THESE PROBLEMS MADE IT FOR YOU TO DO YOUR WORK, TAKE CARE OF THINGS AT HOME, OR GET ALONG WITH OTHER PEOPLE: NOT DIFFICULT AT ALL
1. FEELING NERVOUS, ANXIOUS, OR ON EDGE: SEVERAL DAYS
2. NOT BEING ABLE TO STOP OR CONTROL WORRYING: SEVERAL DAYS
GAD7 TOTAL SCORE: 3

## 2023-08-02 ASSESSMENT — PAIN SCALES - GENERAL: PAINLEVEL: NO PAIN (0)

## 2023-08-02 NOTE — PATIENT INSTRUCTIONS
Assessment & Plan       Benign essential hypertension  - Comprehensive metabolic panel; Future  - hydrochlorothiazide (HYDRODIURIL) 12.5 MG tablet; Take 1 tablet (12.5 mg) by mouth daily  - amLODIPine (NORVASC) 10 MG tablet; Take 1 tablet (10 mg) by mouth daily  - losartan (COZAAR) 100 MG tablet; Take 1 tablet (100 mg) by mouth daily  - metoprolol succinate ER (TOPROL XL) 50 MG 24 hr tablet; Take 1 tablet (50 mg) by mouth daily  - UA Macroscopic with reflex to Microscopic and Culture; Future      Gastroesophageal reflux disease without esophagitis  - Follow-up as needed      Chronic kidney disease, stage 3a (H)  - Albumin Random Urine Quantitative with Creat Ratio; Future  - CBC with platelets and differential; Future        Return in about 6 months (around 2/2/2024).        Yoana Shook CNP  [facility]

## 2023-11-28 ENCOUNTER — TELEPHONE (OUTPATIENT)
Dept: FAMILY MEDICINE | Facility: OTHER | Age: 68
End: 2023-11-28

## 2023-11-28 NOTE — TELEPHONE ENCOUNTER
Please call patient, should be seen to evaluate. Can be seen today at 3:15 with 3 arrival. If unable to come at that time can go to UC/[facility].

## 2023-11-28 NOTE — TELEPHONE ENCOUNTER
Attempt # 1  Outcome: Left Message   Comment: Left VM to call and schedule with PCP/ Back Spasm/ ok for 11/28/23 at 10:45 per provider

## 2023-11-28 NOTE — TELEPHONE ENCOUNTER
10:39 AM    Reason for Call: Phone Call    Description: pt called would like to speak with a nurse, have been having back spasms on and off for over a week, it has not gotten any better. Pt has had issues with her back for years she thinks that she must have twisted or moved wrong to trigger this to start. Was wondering if she needed to be seen. Pt is wondering if she can take anything since she has high bp, pt has taken Alieve and using a heating pad and keeping movements to a minimum     Was an appointment offered for this call? No  If yes : Appointment type              Date    Preferred method for responding to this message: Telephone Call  What is your phone number ? [phone]    If we cannot reach you directly, may we leave a detailed response at the number you provided? No    Can this message wait until your PCP/provider returns, if available today? Not applicable    Justina Thornton

## 2023-11-29 NOTE — PROGRESS NOTES
"  Assessment & Plan         Benign essential hypertension  - Lipid Profile (Chol, Trig, HDL, LDL calc); Future        Acute right-sided low back pain with right-sided sciatica  - cyclobenzaprine (FLEXERIL) 10 MG tablet; Take 1 tablet (10 mg) by mouth 2 times daily as needed for muscle spasms  - Physical Therapy Referral; Future  - Ice  - Voltaren gel OTC  - Ibuprofen OTC        Yoana Shook CNP  [facility] - [place] Orr is a 68 year old, presenting for the following health issues:  Musculoskeletal Problem      Back Pain  When did you first notice your pain? Had lower back pain for years but has increased back spasms since before Thanksgiving.   Have you seen anyone else for your pain? No  How has your pain affected your ability to work? Retired, but limiting ability to do things at home.  Where in your body do you have pain?   Onset/Duration: Past few weeks has had increase in pain and back spasms  Description:   Location of pain: low back right and gluteus right  Character of pain: dull ache and constant  Pain radiation: radiates into the right buttocks and right thigh  New numbness or weakness in legs, not attributed to pain: no   Intensity: Currently 5/10, At its worst 10/10- when spasms  Progression of Symptoms: worsening  History:   Specific cause: Unknown \"Always doing stuff\" Felt something stabbing in back when bending to tie shoe  Pain interferes with job: N/A  History of back problems: MRI several years ago, went to [place] to see a specialist.  Narrowing in the spine- closing in a nerve, DDD, arthritis  Any previous MRI or X-rays: Yes- at [facility] Open upright MRI of [place].  Date 5/6/16 see results below  Sees a specialist for back pain: No  Alleviating factors:   Improved by: heat, NSAIDs, and rest    Precipitating factors:  Worsened by: Lifting, Bending, Standing, and Walking  Therapies tried and outcome: heat, NSAIDs, rest, and stretch          Patient Active Problem List   Diagnosis "    ACP (advance care planning)    Benign essential hypertension    Gastroesophageal reflux disease without esophagitis    Morbid obesity (H)    Chronic kidney disease, stage 3a (H)     Past Surgical History:   Procedure Laterality Date     SECTION         Social History     Tobacco Use    Smoking status: Never    Smokeless tobacco: Never   Substance Use Topics    Alcohol use: No     Family History   Problem Relation Age of Onset    Hypertension Mother     Obesity Mother     Osteoporosis Mother     Arthritis Mother     Heart Failure Mother     Hypertension Father     Hyperlipidemia Father     Coronary Artery Disease Father         MI    Hypertension Brother     Hyperlipidemia Brother     Hypertension Son            Current Outpatient Medications   Medication Sig Dispense Refill    amLODIPine (NORVASC) 10 MG tablet Take 1 tablet (10 mg) by mouth daily 90 tablet 1    aspirin 81 MG EC tablet Take 1 tablet (81 mg) by mouth daily 90 tablet 3    Calcium Carbonate-Vitamin D (CALCIUM 600-D PO) Take 1,600 mg by mouth daily      Cholecalciferol (VITAMIN D3 PO) Take 1,000 Units by mouth daily      hydrochlorothiazide (HYDRODIURIL) 12.5 MG tablet Take 1 tablet (12.5 mg) by mouth daily 90 tablet 1    ibuprofen (ADVIL/MOTRIN) 200 MG tablet Take 400 mg by mouth every 6 hours as needed for mild pain      lactobacillus rhamnosus, GG, (CULTURELL) capsule Take 1 capsule by mouth 2 times daily      losartan (COZAAR) 100 MG tablet Take 1 tablet (100 mg) by mouth daily 90 tablet 1    MAGNESIUM OXIDE PO Take 250 mg by mouth daily      metoprolol succinate ER (TOPROL XL) 50 MG 24 hr tablet Take 1 tablet (50 mg) by mouth daily 90 tablet 1         No Known Allergies        Recent Labs   Lab Test 23  1145 10/18/22  1139 22  1543 22  1543 21  1047 20  1033 01/15/20  0946 17  0849 16  1044   A1C  --   --   --   --  5.3  --  5.8*  --  5.3   LDL  --  110*  --  125* 118* 110* 133*  --  108*    HDL  --  68  --  71 67 68 70  --  73   TRIG  --  99  --  78 87 72 87  --  81   ALT 25 24  --  36 42 33 48   < > 21   CR 1.27* 1.22*   < > 1.15* 1.49* 1.20* 1.28*   < > 1.04   GFRESTIMATED 46* 48*   < > 52* 36* 47* 44*   < > 54*   GFRESTBLACK  --   --   --   --  42* 55* 51*   < > 65   POTASSIUM 4.2 4.0  --  3.7 3.9 3.9 3.8   < > 3.6   TSH  --  2.88  --  2.30 3.27 2.46 3.66   < >  --     < > = values in this interval not displayed.          BP Readings from Last 3 Encounters:   12/01/23 136/72   08/02/23 119/80   10/18/22 128/78    Wt Readings from Last 3 Encounters:   12/01/23 94 kg (207 lb 3.2 oz)   08/02/23 92.4 kg (203 lb 11.2 oz)   10/18/22 93.7 kg (206 lb 8 oz)                    Review of Systems   Constitutional, HEENT, cardiovascular, pulmonary, GI, , musculoskeletal, neuro, skin, endocrine and psych systems are negative, except as otherwise noted.          Objective    /72 (BP Location: Left arm, Patient Position: Sitting, Cuff Size: Adult Large)   Pulse 76   Temp 98.1  F (36.7  C) (Tympanic)   Wt 94 kg (207 lb 3.2 oz)   SpO2 96%   BMI 38.52 kg/m    Body mass index is 38.52 kg/m .        Physical Exam   GENERAL: healthy, alert and no distress  NECK: no adenopathy, no asymmetry, masses, or scars and thyroid normal to palpation  RESP: lungs clear to auscultation - no rales, rhonchi or wheezes  CV: regular rate and rhythm, normal S1 S2, no S3 or S4, no murmur, click or rub, no peripheral edema and peripheral pulses strong  MS: Lumbar pain on the right - right SI joint pain, anterior Right leg tingling  SKIN: no suspicious lesions or rashes  PSYCH: mentation appears normal, affect normal/bright

## 2023-12-01 ENCOUNTER — OFFICE VISIT (OUTPATIENT)
Dept: FAMILY MEDICINE | Facility: OTHER | Age: 68
End: 2023-12-01
Attending: NURSE PRACTITIONER
Payer: COMMERCIAL

## 2023-12-01 VITALS
OXYGEN SATURATION: 96 % | BODY MASS INDEX: 38.52 KG/M2 | TEMPERATURE: 98.1 F | WEIGHT: 207.2 LBS | HEART RATE: 76 BPM | DIASTOLIC BLOOD PRESSURE: 72 MMHG | SYSTOLIC BLOOD PRESSURE: 136 MMHG

## 2023-12-01 DIAGNOSIS — M54.41 ACUTE RIGHT-SIDED LOW BACK PAIN WITH RIGHT-SIDED SCIATICA: ICD-10-CM

## 2023-12-01 DIAGNOSIS — I10 BENIGN ESSENTIAL HYPERTENSION: Primary | ICD-10-CM

## 2023-12-01 LAB
CHOLEST SERPL-MCNC: 201 MG/DL
HDLC SERPL-MCNC: 76 MG/DL
HOLD SPECIMEN: NORMAL
LDLC SERPL CALC-MCNC: 113 MG/DL
NONHDLC SERPL-MCNC: 125 MG/DL
TRIGL SERPL-MCNC: 59 MG/DL

## 2023-12-01 PROCEDURE — G0463 HOSPITAL OUTPT CLINIC VISIT: HCPCS

## 2023-12-01 PROCEDURE — 80061 LIPID PANEL: CPT | Mod: ZL | Performed by: NURSE PRACTITIONER

## 2023-12-01 PROCEDURE — 99214 OFFICE O/P EST MOD 30 MIN: CPT | Performed by: NURSE PRACTITIONER

## 2023-12-01 PROCEDURE — 36415 COLL VENOUS BLD VENIPUNCTURE: CPT | Mod: ZL | Performed by: NURSE PRACTITIONER

## 2023-12-01 RX ORDER — CYCLOBENZAPRINE HCL 10 MG
10 TABLET ORAL 2 TIMES DAILY PRN
Qty: 60 TABLET | Refills: 1 | Status: SHIPPED | OUTPATIENT
Start: 2023-12-01

## 2023-12-01 ASSESSMENT — PAIN SCALES - GENERAL: PAINLEVEL: SEVERE PAIN (6)

## 2023-12-01 NOTE — PATIENT INSTRUCTIONS
Assessment & Plan         Benign essential hypertension  - Lipid Profile (Chol, Trig, HDL, LDL calc); Future        Acute right-sided low back pain with right-sided sciatica  - cyclobenzaprine (FLEXERIL) 10 MG tablet; Take 1 tablet (10 mg) by mouth 2 times daily as needed for muscle spasms  - Physical Therapy Referral; Future  - Ice  - Voltaren gel OTC  - Ibuprofen OTC        Yoana Shook, CNP  [facility] [place] [facility]

## 2023-12-07 ENCOUNTER — THERAPY VISIT (OUTPATIENT)
Dept: PHYSICAL THERAPY | Facility: OTHER | Age: 68
End: 2023-12-07
Payer: MEDICARE

## 2023-12-07 DIAGNOSIS — M54.41 ACUTE RIGHT-SIDED LOW BACK PAIN WITH RIGHT-SIDED SCIATICA: Primary | ICD-10-CM

## 2023-12-07 PROCEDURE — 97162 PT EVAL MOD COMPLEX 30 MIN: CPT | Mod: GP

## 2023-12-07 NOTE — PROGRESS NOTES
"PHYSICAL THERAPY EVALUATION  Type of Visit: Evaluation    See electronic medical record for Abuse and Falls Screening details.    Subjective       Presenting condition or subjective complaint: lower back  Date of onset: 11/23/23    Relevant medical history: High blood pressure   Dates & types of surgery:      Prior diagnostic imaging/testing results: MRI     Prior therapy history for the same diagnosis, illness or injury: No      Prior Level of Function  Transfers: Independent  Ambulation: Independent  ADL: Independent  IADL:   Living Environment  Social support: With a significant other or spouse   Type of home: House   Stairs to enter the home:         Ramp: No   Stairs inside the home: Yes 5 Is there a railing: Yes   Help at home:    Equipment owned:       Employment: No    Hobbies/Interests:      Patient goals for therapy:      Pain assessment:  Patient reports a longstanding history 10+ years of back pain.  She recalls an incident several years ago when she was lifting and twisting and having significant back pain.  She reports she did have an MRI in 2016 and was seen by a specialist in the [facility] area with no surgery recommended at that time.  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  Multilevel bulge with moderate bilateral foraminal stenosis L5 ganglionic abutment at L5-S1 and mild foraminal stenosis on the left at L3-4 and bilaterally at L4-5.  She notes more recently approximately 2 weeks ago she began having a new symptom of \"back spasms \"that were severe.  She had been doing a lot of decorating lifting carrying etc. and is unsure if that aggravated her back symptoms.  She was recently seen by her primary care provider and is now referred to physical therapy.  Patient states she was prescribed Flexeril which she has been taking as directed and this has seemed to " "especially help with her \"spasms\".     Objective   LUMBAR SPINE EVALUATION  PAIN: Pain Level at Rest: 0/10  Pain Level with Use: 10/10  Pain Location: Right low back with radiating symptoms into the right gluteal and burning pain along the anterior thigh.  She has had numbness into the bilateral feet toes primarily for several years  Pain Quality: Aching, Burning, Dull, and Numb  Pain Frequency: constant  Pain is Exacerbated By: Lifting, walking, sitting on a hard chair or sitting for long periods of time, standing, lying in a prone or supine position, bending  Pain is Relieved By: cold, heat, and NSAIDs  Pain Progression: Worsened  INTEGUMENTARY (edema, incisions):   POSTURE: The left shoulder and iliac crest were elevated as compared to the right.  She was noted to have a forward head rounded shoulder posture.  Bilateral calcaneal pronation was noted  GAIT:   Weightbearing Status:   Assistive Device(s): None  Gait Deviations: WFL  BALANCE/PROPRIOCEPTION:   WEIGHTBEARING ALIGNMENT:   NON-WEIGHTBEARING ALIGNMENT:    ROM:  Trunk active range of motion: Forward flexion = 0 to 80 degrees with right low back pain, back bending = 0 to 20 degrees with right lateral gluteal/hip pain , right sidebending = 0 to 24 degrees with pain on right low back, left sidebending = 0 to 22 degrees with pain on right low back  PELVIC/SI SCREEN:  Testing positive for possible right on left backward sacral torsion dysfunction  STRENGTH:  She was able to heel and toe walk well.  She was able to withstand moderate assistance with her trunk flexors and trunk extensors    MYOTOMES:   DTR S: WNL  CORD SIGNS: WNL  DERMATOMES: WNL  NEURAL TENSION: Lumbar WNL  FLEXIBILITY:  Hypomobility noted in the hamstring, quadriceps, gastroc and piriformis muscles  LUMBAR/HIP Special Tests: Negative hip special tests  PELVIS/SI SPECIAL TESTS:  Positive right and left backward sacral torsion dysfunction  FUNCTIONAL TESTS:   PALPATION:  Point tenderness over the " right PSIS/SI joint.  Soft tissue tension and tenderness palpation along the right piriformis/gluteal muscles, to a lesser degree along the right lumbar paraspinals and quadratus lumborum muscles  SPINAL SEGMENTAL CONCLUSIONS:  Fullness noted in the left thoracolumbar spine.  Posterior rotation left noted in the lumbar and thoracic region in flexion extension and neutral positioning       Assessment & Plan   CLINICAL IMPRESSIONS  Medical Diagnosis: Acute right-sided low back pain with right-sided sciatica    Treatment Diagnosis: Acute right-sided low back pain with right-sided sciatica   Impression/Assessment: Patient is a 68 year old female with right low back and anterior thigh complaints.  The following significant findings have been identified: Pain, Decreased ROM/flexibility, Decreased joint mobility, Decreased strength, Impaired muscle performance, and Decreased activity tolerance. These impairments interfere with their ability to perform self care tasks, recreational activities, household chores, household mobility, and community mobility as compared to previous level of function.     Clinical Decision Making (Complexity):  Clinical Presentation: Evolving/Changing  Clinical Presentation Rationale: based on medical and personal factors listed in PT evaluation  Clinical Decision Making (Complexity): Moderate complexity    PLAN OF CARE  Treatment Interventions:  Modalities: Cryotherapy, E-stim, Ultrasound  Interventions: Manual Therapy, Neuromuscular Re-education, Therapeutic Activity, Therapeutic Exercise, Self-Care/Home Management    Long Term Goals     PT Goal 1  Goal Identifier: STG 1  Goal Description: Decreased pain when at its worst to a 8/10  Target Date: 12/21/23  PT Goal 2  Goal Identifier: LTG 1  Goal Description: Patient will demonstrate independence with home exercise program  Target Date: 02/01/24  PT Goal 3  Goal Identifier: LTG 2  Goal Description: Patient will be able to walk 2+ miles without  having significant back or leg pain 75% of the time  Rationale: to maximize safety and independence within the community  Target Date: 02/01/24      Frequency of Treatment: 2 times/week  Duration of Treatment: Up to 8 weeks    Recommended Referrals to Other Professionals: Physical Therapy  Education Assessment:   Learner/Method: Patient;Listening;Pictures/Video;No Barriers to Learning  Education Comments: Patient education/discussion in today's objective findings along with recommended treatment plan.  She we will try alternating heat/ice.  We also discussed proper lifting techniques and recommendations to avoid aggravating positions/activities    Risks and benefits of evaluation/treatment have been explained.   Patient/Family/caregiver agrees with Plan of Care.     Evaluation Time:     PT Eval, Moderate Complexity Minutes (53185): 45       Signing Clinician: KARINA Harrington [facility]                                                                                   OUTPATIENT PHYSICAL THERAPY      PLAN OF TREATMENT FOR OUTPATIENT REHABILITATION   Patient's Last Name, First Name, UVALDORAZ  EsperanzalizethDominga  R YOB: 1955   Provider's Name   [facility]   Medical Record No.  [id number]     Onset Date: 11/23/23  Start of Care Date: 12/07/23     Medical Diagnosis:  Acute right-sided low back pain with right-sided sciatica      PT Treatment Diagnosis:  Acute right-sided low back pain with right-sided sciatica Plan of Treatment  Frequency/Duration: 2 times/week/ Up to 8 weeks    Certification date from 12/07/23 to 02/01/24         See note for plan of treatment details and functional goals     Tete Wong PT                         I CERTIFY THE NEED FOR THESE SERVICES FURNISHED UNDER        THIS PLAN OF TREATMENT AND WHILE UNDER MY CARE     (Physician attestation of this document indicates review and certification of the therapy plan).               Referring Provider:  Yoana Shook CNP    Initial Assessment  See Epic Evaluation- Start of Care Date: 12/07/23

## 2023-12-12 ENCOUNTER — THERAPY VISIT (OUTPATIENT)
Dept: PHYSICAL THERAPY | Facility: OTHER | Age: 68
End: 2023-12-12
Payer: MEDICARE

## 2023-12-12 DIAGNOSIS — M54.41 ACUTE RIGHT-SIDED LOW BACK PAIN WITH RIGHT-SIDED SCIATICA: Primary | ICD-10-CM

## 2023-12-12 PROCEDURE — 97140 MANUAL THERAPY 1/> REGIONS: CPT | Mod: GP

## 2023-12-12 PROCEDURE — 97035 APP MDLTY 1+ULTRASOUND EA 15: CPT | Mod: GP

## 2023-12-14 ENCOUNTER — THERAPY VISIT (OUTPATIENT)
Dept: PHYSICAL THERAPY | Facility: OTHER | Age: 68
End: 2023-12-14
Payer: MEDICARE

## 2023-12-14 DIAGNOSIS — M54.41 ACUTE RIGHT-SIDED LOW BACK PAIN WITH RIGHT-SIDED SCIATICA: Primary | ICD-10-CM

## 2023-12-14 PROCEDURE — 97140 MANUAL THERAPY 1/> REGIONS: CPT | Mod: GP

## 2023-12-14 PROCEDURE — 97035 APP MDLTY 1+ULTRASOUND EA 15: CPT | Mod: GP

## 2023-12-20 ENCOUNTER — THERAPY VISIT (OUTPATIENT)
Dept: PHYSICAL THERAPY | Facility: OTHER | Age: 68
End: 2023-12-20
Attending: NURSE PRACTITIONER
Payer: MEDICARE

## 2023-12-20 DIAGNOSIS — M54.41 ACUTE RIGHT-SIDED LOW BACK PAIN WITH RIGHT-SIDED SCIATICA: Primary | ICD-10-CM

## 2023-12-20 PROCEDURE — 97140 MANUAL THERAPY 1/> REGIONS: CPT | Mod: GP

## 2023-12-20 PROCEDURE — 97035 APP MDLTY 1+ULTRASOUND EA 15: CPT | Mod: GP

## 2023-12-22 ENCOUNTER — THERAPY VISIT (OUTPATIENT)
Dept: PHYSICAL THERAPY | Facility: OTHER | Age: 68
End: 2023-12-22
Attending: NURSE PRACTITIONER
Payer: MEDICARE

## 2023-12-22 DIAGNOSIS — M54.41 ACUTE RIGHT-SIDED LOW BACK PAIN WITH RIGHT-SIDED SCIATICA: Primary | ICD-10-CM

## 2023-12-22 PROCEDURE — 97140 MANUAL THERAPY 1/> REGIONS: CPT | Mod: GP

## 2023-12-22 PROCEDURE — 97035 APP MDLTY 1+ULTRASOUND EA 15: CPT | Mod: GP

## 2023-12-27 ENCOUNTER — THERAPY VISIT (OUTPATIENT)
Dept: PHYSICAL THERAPY | Facility: OTHER | Age: 68
End: 2023-12-27
Attending: NURSE PRACTITIONER
Payer: MEDICARE

## 2023-12-27 DIAGNOSIS — M54.41 ACUTE RIGHT-SIDED LOW BACK PAIN WITH RIGHT-SIDED SCIATICA: Primary | ICD-10-CM

## 2023-12-27 PROCEDURE — 97140 MANUAL THERAPY 1/> REGIONS: CPT | Mod: GP

## 2023-12-27 PROCEDURE — 97035 APP MDLTY 1+ULTRASOUND EA 15: CPT | Mod: GP

## 2024-01-04 ENCOUNTER — THERAPY VISIT (OUTPATIENT)
Dept: PHYSICAL THERAPY | Facility: OTHER | Age: 69
End: 2024-01-04
Attending: NURSE PRACTITIONER
Payer: MEDICARE

## 2024-01-04 DIAGNOSIS — M54.41 ACUTE RIGHT-SIDED LOW BACK PAIN WITH RIGHT-SIDED SCIATICA: Primary | ICD-10-CM

## 2024-01-04 PROCEDURE — 97035 APP MDLTY 1+ULTRASOUND EA 15: CPT | Mod: GP

## 2024-01-04 PROCEDURE — 97140 MANUAL THERAPY 1/> REGIONS: CPT | Mod: GP

## 2024-01-24 DIAGNOSIS — I10 BENIGN ESSENTIAL HYPERTENSION: ICD-10-CM

## 2024-01-24 RX ORDER — LOSARTAN POTASSIUM 100 MG/1
100 TABLET ORAL DAILY
Qty: 90 TABLET | Refills: 1 | Status: SHIPPED | OUTPATIENT
Start: 2024-01-24 | End: 2024-02-20

## 2024-01-24 NOTE — TELEPHONE ENCOUNTER
LOSARTAN POTASSIUM 100 MG TAB           Last Written Prescription Date:  8/2/23  Last Fill Quantity: 90,   # refills: 1  Last Office Visit: 12/1/23  Future Office visit:    Next 5 appointments (look out 90 days)      Feb 20, 2024 11:30 AM  (Arrive by 11:15 AM)  SHORT with Yoana Shook CNP  [facility] ([facility] ) [address]  [facility] [address]  [phone]             Routing refill request to provider for review/approval because:  Angiotensin-II Receptors [id number]/24/2024 12:31 AM   Protocol Details Has GFR on file in past 12 months and most recent value is normal     GFR Estimate   Date Value Ref Range Status   08/02/2023 46 (L) >60 mL/min/1.73m2 Final   02/22/2021 36 (L) >60 mL/min/[1.73_m2] Final     Comment:     Non  GFR Calc  Starting 12/18/2018, serum creatinine based estimated GFR (eGFR) will be   calculated using the Chronic Kidney Disease Epidemiology Collaboration   (CKD-EPI) equation.       GFR Estimate If Black   Date Value Ref Range Status   02/22/2021 42 (L) >60 mL/min/[1.73_m2] Final     Comment:      GFR Calc  Starting 12/18/2018, serum creatinine based estimated GFR (eGFR) will be   calculated using the Chronic Kidney Disease Epidemiology Collaboration   (CKD-EPI) equation.           
room air

## 2024-01-31 DIAGNOSIS — I10 BENIGN ESSENTIAL HYPERTENSION: ICD-10-CM

## 2024-01-31 RX ORDER — METOPROLOL SUCCINATE 50 MG/1
50 TABLET, EXTENDED RELEASE ORAL DAILY
Qty: 90 TABLET | Refills: 2 | Status: SHIPPED | OUTPATIENT
Start: 2024-01-31 | End: 2024-02-20

## 2024-01-31 RX ORDER — AMLODIPINE BESYLATE 10 MG/1
10 TABLET ORAL DAILY
Qty: 90 TABLET | Refills: 0 | Status: SHIPPED | OUTPATIENT
Start: 2024-01-31 | End: 2024-02-20

## 2024-01-31 NOTE — TELEPHONE ENCOUNTER
Amlodipine      Last Written Prescription Date:  8/2/23  Last Fill Quantity: 90,   # refills: 1  Last Office Visit: 12/1/23  Future Office visit:    Next 5 appointments (look out 90 days)      Feb 20, 2024 11:30 AM  (Arrive by 11:15 AM)  SHORT with Yoana Shook CNP  [facility] ([facility] ) [address]  [place] [address]  [phone]             Routing refill request to provider for review/approval because:      Toprol      Last Written Prescription Date:  8/2/23  Last Fill Quantity: 90,   # refills: 1  Last Office Visit: 12/1/23  Future Office visit:    Next 5 appointments (look out 90 days)      Feb 20, 2024 11:30 AM  (Arrive by 11:15 AM)  SHORT with Yoana Shook CNP  [facility] ([facility] ) [address]  [place] [address]  [phone]             Routing refill request to provider for review/approval because:

## 2024-02-14 NOTE — PROGRESS NOTES
DISCHARGE  Reason for Discharge: Patient has met all goals.    Equipment Issued: none    Discharge Plan: Patient to continue home program.    Referring Provider:  Yoana Shook CNP   01/04/24 [id number]   Appointment Info   Signing clinician's name / credentials Tete Wong PT   Visits Used -10 Medicare not needed   Medical Diagnosis Acute right-sided low back pain with right-sided sciatica   PT Tx Diagnosis Acute right-sided low back pain with right-sided sciatica   Precautions/Limitations None   Progress Note/Certification   Start of Care Date 12/07/23   Onset of illness/injury or Date of Surgery 11/23/23   Therapy Frequency 2 times/week   Predicted Duration Up to 8 weeks   Certification date from 12/07/23   Certification date to 02/01/24   PT Goal 1   Goal Identifier STG 1   Goal Description Decreased pain when at its worst to a 8/10   Target Date 12/21/23   Date Met 12/21/23   PT Goal 2   Goal Identifier LTG 1   Goal Description Patient will demonstrate independence with home exercise program   Target Date 02/01/24   Date Met 01/04/24   PT Goal 3   Goal Identifier LTG 2   Goal Description Patient will be able to walk 2+ miles without having significant back or leg pain 75% of the time   Target Date 02/01/24   Date Met 01/04/24   Subjective Report   Subjective Report pt reports she is diong much better than before PT. SHe is able to do more housework. SHe does note pain when trying to lie on her stomach.   Objective Measure 1   Objective Measure palpation   Details mild remaining soft tissue tension and tenderness R gluteal /piriormis mms   Ultrasound   Ultrasound Minutes (71832) 8   Ultrasound -Type (does not include 3-5 min prep/cleanup time) Continuous   Intensity 1.5w/cm2   Duration (does not include the 3-5 min set up/clean up time) 8 min   Frequency 1 MHz   Location R LB/ gluteal region   Positioning sidelying   Patient Response/Progress tolerated well   Therapeutic Procedure/Exercise   Therapeutic Procedures:  strength, endurance, ROM, flexibillity minutes (79529) 4   Ther Proc 1 Access Code: [email or web address]  URL: [email or web address]  Date: 01/04/2024  Prepared by: Tete Wong    Exercises  - Supine Bridge  - 1 x daily - 3-4 x weekly - 1 sets - 3-5 reps - 5-10 secs hold   Skilled Intervention verbal and manual cues, pt ed   Patient Response/Progress good - pt able to perform ex correctly and will avoid lifting too high   Manual Therapy   Manual Therapy: Mobilization, MFR, MLD, friction massage minutes (53491) 28   Manual Therapy 1 STM   Manual Therapy 1 - Details R low back / gluteal mms   Manual Therapy 2 MET   Manual Therapy 2 - Details re-assessed SIJ w no dysfx found   Skilled Intervention decrease soft tissue and or spinal/ SIJ restrictions   Patient Response/Progress good - mild remaining soft tissue tension in the R piriformis mm   Plan   Home program see above   Plan for next session pt will see how she is doing next week, she may cancel if still doing good.   Total Session Time   Timed Code Treatment Minutes 40   Total Treatment Time (sum of timed and untimed services) 40

## 2024-02-20 ENCOUNTER — OFFICE VISIT (OUTPATIENT)
Dept: FAMILY MEDICINE | Facility: OTHER | Age: 69
End: 2024-02-20
Attending: NURSE PRACTITIONER
Payer: COMMERCIAL

## 2024-02-20 VITALS
HEART RATE: 88 BPM | DIASTOLIC BLOOD PRESSURE: 68 MMHG | WEIGHT: 201.1 LBS | TEMPERATURE: 97.8 F | OXYGEN SATURATION: 95 % | RESPIRATION RATE: 20 BRPM | BODY MASS INDEX: 37.38 KG/M2 | SYSTOLIC BLOOD PRESSURE: 124 MMHG

## 2024-02-20 DIAGNOSIS — I10 BENIGN ESSENTIAL HYPERTENSION: Primary | ICD-10-CM

## 2024-02-20 DIAGNOSIS — Z12.31 ENCOUNTER FOR SCREENING MAMMOGRAM FOR BREAST CANCER: ICD-10-CM

## 2024-02-20 DIAGNOSIS — R73.09 ELEVATED GLUCOSE: ICD-10-CM

## 2024-02-20 DIAGNOSIS — N18.31 CHRONIC KIDNEY DISEASE, STAGE 3A (H): ICD-10-CM

## 2024-02-20 PROBLEM — K21.9 GASTROESOPHAGEAL REFLUX DISEASE WITHOUT ESOPHAGITIS: Status: RESOLVED | Noted: 2019-01-09 | Resolved: 2024-02-20

## 2024-02-20 LAB
ALBUMIN SERPL BCG-MCNC: 4.6 G/DL (ref 3.5–5.2)
ALP SERPL-CCNC: 89 U/L (ref 40–150)
ALT SERPL W P-5'-P-CCNC: 23 U/L (ref 0–50)
ANION GAP SERPL CALCULATED.3IONS-SCNC: 16 MMOL/L (ref 7–15)
AST SERPL W P-5'-P-CCNC: 24 U/L (ref 0–45)
BILIRUB SERPL-MCNC: 0.9 MG/DL
BUN SERPL-MCNC: 20.4 MG/DL (ref 8–23)
CALCIUM SERPL-MCNC: 9.9 MG/DL (ref 8.8–10.2)
CHLORIDE SERPL-SCNC: 99 MMOL/L (ref 98–107)
CHOLEST SERPL-MCNC: 197 MG/DL
CREAT SERPL-MCNC: 1.32 MG/DL (ref 0.51–0.95)
DEPRECATED HCO3 PLAS-SCNC: 19 MMOL/L (ref 22–29)
EGFRCR SERPLBLD CKD-EPI 2021: 44 ML/MIN/1.73M2
FASTING STATUS PATIENT QL REPORTED: YES
GLUCOSE SERPL-MCNC: 137 MG/DL (ref 70–99)
HDLC SERPL-MCNC: 71 MG/DL
HOLD SPECIMEN: NORMAL
LDLC SERPL CALC-MCNC: 109 MG/DL
NONHDLC SERPL-MCNC: 126 MG/DL
POTASSIUM SERPL-SCNC: 4.1 MMOL/L (ref 3.4–5.3)
PROT SERPL-MCNC: 8.1 G/DL (ref 6.4–8.3)
SODIUM SERPL-SCNC: 134 MMOL/L (ref 135–145)
TRIGL SERPL-MCNC: 85 MG/DL
TSH SERPL DL<=0.005 MIU/L-ACNC: 2.36 UIU/ML (ref 0.3–4.2)

## 2024-02-20 PROCEDURE — 84443 ASSAY THYROID STIM HORMONE: CPT | Mod: ZL | Performed by: NURSE PRACTITIONER

## 2024-02-20 PROCEDURE — 36415 COLL VENOUS BLD VENIPUNCTURE: CPT | Mod: ZL | Performed by: NURSE PRACTITIONER

## 2024-02-20 PROCEDURE — 99214 OFFICE O/P EST MOD 30 MIN: CPT | Performed by: NURSE PRACTITIONER

## 2024-02-20 PROCEDURE — 80053 COMPREHEN METABOLIC PANEL: CPT | Mod: ZL | Performed by: NURSE PRACTITIONER

## 2024-02-20 PROCEDURE — G0463 HOSPITAL OUTPT CLINIC VISIT: HCPCS

## 2024-02-20 PROCEDURE — 83036 HEMOGLOBIN GLYCOSYLATED A1C: CPT | Mod: ZL | Performed by: NURSE PRACTITIONER

## 2024-02-20 PROCEDURE — 80061 LIPID PANEL: CPT | Mod: ZL | Performed by: NURSE PRACTITIONER

## 2024-02-20 RX ORDER — AMLODIPINE BESYLATE 10 MG/1
10 TABLET ORAL DAILY
Qty: 90 TABLET | Refills: 1 | Status: SHIPPED | OUTPATIENT
Start: 2024-02-20 | End: 2024-08-21

## 2024-02-20 RX ORDER — HYDROCHLOROTHIAZIDE 12.5 MG/1
12.5 TABLET ORAL DAILY
Qty: 90 TABLET | Refills: 1 | Status: SHIPPED | OUTPATIENT
Start: 2024-02-20 | End: 2024-08-21

## 2024-02-20 RX ORDER — LOSARTAN POTASSIUM 100 MG/1
100 TABLET ORAL DAILY
Qty: 90 TABLET | Refills: 1 | Status: SHIPPED | OUTPATIENT
Start: 2024-02-20 | End: 2024-08-21

## 2024-02-20 RX ORDER — METOPROLOL SUCCINATE 50 MG/1
50 TABLET, EXTENDED RELEASE ORAL DAILY
Qty: 90 TABLET | Refills: 1 | Status: SHIPPED | OUTPATIENT
Start: 2024-02-20 | End: 2024-08-21

## 2024-02-20 ASSESSMENT — PAIN SCALES - GENERAL: PAINLEVEL: NO PAIN (0)

## 2024-02-20 NOTE — PROGRESS NOTES
Assessment & Plan         Benign essential hypertension  - Comprehensive metabolic panel; Future  - Lipid Profile (Chol, Trig, HDL, LDL calc); Future  - TSH with free T4 reflex; Future  - amLODIPine (NORVASC) 10 MG tablet; Take 1 tablet (10 mg) by mouth daily  - hydroCHLOROthiazide (HYDRODIURIL) 12.5 MG tablet; Take 1 tablet (12.5 mg) by mouth daily  - losartan (COZAAR) 100 MG tablet; Take 1 tablet (100 mg) by mouth daily  - metoprolol succinate ER (TOPROL XL) 50 MG 24 hr tablet; Take 1 tablet (50 mg) by mouth daily      Chronic kidney disease, stage 3a (H)  - CMP ordered        Encounter for screening mammogram for breast cancer  - MA Screen Bilateral w/Hugo; Future        Please continue to take all medication as directed  Please notify your pharmacy or our refill line of future refills needed.  Please return sooner than your next scheduled appointment with any concerns.        Return in about 6 months (around 8/20/2024).        Yoana Bouchra [facility]  [phone]         Dominga is a 68 year old, presenting for the following health issues:  Chronic Disease Management          Hypertension Follow-up  Do you check your blood pressure regularly outside of the clinic? No   Are you following a low salt diet? Yes  Are your blood pressures ever more than 140 on the top number (systolic) OR more   than 90 on the bottom number (diastolic), for example 140/90? No          Chronic Kidney Disease Follow-up  Do you take any over the counter pain medicine?: Yes  What over the counter medicine are you taking for your pain?:  Advil    How often do you take this medicine?:  A few times a week          GERD/Heartburn  Onset/Duration: prior  Accompanying Signs & Symptoms:  Does it feel like food gets stuck or trouble swallowing: No  Nausea: No  Vomiting (bloody?): No  Abdominal Pain: No  Black-Tarry stools: No  Bloody stools: No  History:  Previous ulcers: No  Therapies tried and outcome:             Medications:  antacids          Patient Active Problem List   Diagnosis    ACP (advance care planning)    Benign essential hypertension    Morbid obesity (H)    Chronic kidney disease, stage 3a (H)    Acute right-sided low back pain with right-sided sciatica     Past Surgical History:   Procedure Laterality Date     SECTION         Social History     Tobacco Use    Smoking status: Never    Smokeless tobacco: Never   Substance Use Topics    Alcohol use: No     Family History   Problem Relation Age of Onset    Hypertension Mother     Obesity Mother     Osteoporosis Mother     Arthritis Mother     Heart Failure Mother     Hypertension Father     Hyperlipidemia Father     Coronary Artery Disease Father         MI    Hypertension Brother     Hyperlipidemia Brother     Hypertension Son              Current Outpatient Medications   Medication Sig Dispense Refill    amLODIPine (NORVASC) 10 MG tablet Take 1 tablet (10 mg) by mouth daily 90 tablet 1    aspirin 81 MG EC tablet Take 1 tablet (81 mg) by mouth daily 90 tablet 3    Calcium Carbonate-Vitamin D (CALCIUM 600-D PO) Take 1,600 mg by mouth daily      Cholecalciferol (VITAMIN D3 PO) Take 1,000 Units by mouth daily      cyclobenzaprine (FLEXERIL) 10 MG tablet Take 1 tablet (10 mg) by mouth 2 times daily as needed for muscle spasms 60 tablet 1    hydroCHLOROthiazide (HYDRODIURIL) 12.5 MG tablet Take 1 tablet (12.5 mg) by mouth daily 90 tablet 1    ibuprofen (ADVIL/MOTRIN) 200 MG tablet Take 400 mg by mouth every 6 hours as needed for mild pain      lactobacillus rhamnosus, GG, (CULTURELL) capsule Take 1 capsule by mouth 2 times daily      losartan (COZAAR) 100 MG tablet Take 1 tablet (100 mg) by mouth daily 90 tablet 1    MAGNESIUM OXIDE PO Take 250 mg by mouth daily      metoprolol succinate ER (TOPROL XL) 50 MG 24 hr tablet Take 1 tablet (50 mg) by mouth daily 90 tablet 1         No Known Allergies        Recent Labs   Lab Test 23  1454 23  1145 10/18/22  1139  04/18/22  1543 04/18/22  1543 02/22/21  1047 08/19/20  1033 01/15/20  0946 12/18/17  0849 06/27/16  1044   A1C  --   --   --   --   --  5.3  --  5.8*  --  5.3   *  --  110*  --  125* 118* 110* 133*  --  108*   HDL 76  --  68  --  71 67 68 70  --  73   TRIG 59  --  99  --  78 87 72 87  --  81   ALT  --  25 24  --  36 42 33 48   < > 21   CR  --  1.27* 1.22*   < > 1.15* 1.49* 1.20* 1.28*   < > 1.04   GFRESTIMATED  --  46* 48*   < > 52* 36* 47* 44*   < > 54*   GFRESTBLACK  --   --   --   --   --  42* 55* 51*   < > 65   POTASSIUM  --  4.2 4.0  --  3.7 3.9 3.9 3.8   < > 3.6   TSH  --   --  2.88  --  2.30 3.27 2.46 3.66   < >  --     < > = values in this interval not displayed.          BP Readings from Last 3 Encounters:   02/20/24 124/68   12/01/23 136/72   08/02/23 119/80    Wt Readings from Last 3 Encounters:   02/20/24 91.2 kg (201 lb 1.6 oz)   12/01/23 94 kg (207 lb 3.2 oz)   08/02/23 92.4 kg (203 lb 11.2 oz)                    Review of Systems  Constitutional, HEENT, cardiovascular, pulmonary, GI, , musculoskeletal, neuro, skin, endocrine and psych systems are negative, except as otherwise noted.          Objective    /68 (BP Location: Right arm, Patient Position: Sitting, Cuff Size: Adult Large)   Pulse 88   Temp 97.8  F (36.6  C) (Tympanic)   Resp 20   Wt 91.2 kg (201 lb 1.6 oz)   SpO2 95%   BMI 37.38 kg/m    Body mass index is 37.38 kg/m .        Physical Exam   GENERAL: alert and no distress  EYES: Eyes grossly normal to inspection, PERRL and conjunctivae and sclerae normal  HENT: ear canals and TM's normal, nose and mouth without ulcers or lesions  NECK: no adenopathy, no asymmetry, masses, or scars  RESP: lungs clear to auscultation - no rales, rhonchi or wheezes  CV: regular rate and rhythm, normal S1 S2, no S3 or S4, no murmur, click or rub, no peripheral edema  MS: no gross musculoskeletal defects noted, no edema  SKIN: no suspicious lesions or rashes  PSYCH: mentation appears normal,  affect normal/bright            Signed Electronically by: Yoana Shook, CNP

## 2024-02-20 NOTE — PATIENT INSTRUCTIONS
Assessment & Plan         Benign essential hypertension  - Comprehensive metabolic panel; Future  - Lipid Profile (Chol, Trig, HDL, LDL calc); Future  - TSH with free T4 reflex; Future  - amLODIPine (NORVASC) 10 MG tablet; Take 1 tablet (10 mg) by mouth daily  - hydroCHLOROthiazide (HYDRODIURIL) 12.5 MG tablet; Take 1 tablet (12.5 mg) by mouth daily  - losartan (COZAAR) 100 MG tablet; Take 1 tablet (100 mg) by mouth daily  - metoprolol succinate ER (TOPROL XL) 50 MG 24 hr tablet; Take 1 tablet (50 mg) by mouth daily      Chronic kidney disease, stage 3a (H)  - CMP ordered        Encounter for screening mammogram for breast cancer  - MA Screen Bilateral w/Hugo; Future        Please continue to take all medication as directed  Please notify your pharmacy or our refill line of future refills needed.  Please return sooner than your next scheduled appointment with any concerns.        Return in about 6 months (around 8/20/2024).        Yoana STRONG  [phone]

## 2024-02-21 DIAGNOSIS — R73.09 ELEVATED GLUCOSE: Primary | ICD-10-CM

## 2024-02-21 LAB
EST. AVERAGE GLUCOSE BLD GHB EST-MCNC: 114 MG/DL
HBA1C MFR BLD: 5.6 %

## 2024-02-21 NOTE — RESULT ENCOUNTER NOTE
Normal A1C, she is non-diabetic.  Please notify    Yoana JAMES-[facility]  [phone]   
63.5

## 2024-04-04 ENCOUNTER — TELEPHONE (OUTPATIENT)
Dept: FAMILY MEDICINE | Facility: OTHER | Age: 69
End: 2024-04-04

## 2024-04-04 NOTE — TELEPHONE ENCOUNTER
Attempt # 1  Outcome: Talked with Patient    Comment: Due for her Medicare Annual Wellness exam with Yoana Shook spoke with patient and she is not interested in this type of an appointment and she sees Yoana Shook every 6 months

## 2024-08-21 ENCOUNTER — OFFICE VISIT (OUTPATIENT)
Dept: FAMILY MEDICINE | Facility: OTHER | Age: 69
End: 2024-08-21
Attending: NURSE PRACTITIONER
Payer: COMMERCIAL

## 2024-08-21 VITALS
WEIGHT: 203.9 LBS | TEMPERATURE: 97.7 F | SYSTOLIC BLOOD PRESSURE: 126 MMHG | DIASTOLIC BLOOD PRESSURE: 78 MMHG | BODY MASS INDEX: 37.9 KG/M2 | HEART RATE: 78 BPM | OXYGEN SATURATION: 97 % | RESPIRATION RATE: 18 BRPM

## 2024-08-21 DIAGNOSIS — I10 BENIGN ESSENTIAL HYPERTENSION: ICD-10-CM

## 2024-08-21 DIAGNOSIS — N18.31 CHRONIC KIDNEY DISEASE, STAGE 3A (H): Primary | ICD-10-CM

## 2024-08-21 LAB
ALBUMIN SERPL BCG-MCNC: 4.5 G/DL (ref 3.5–5.2)
ALP SERPL-CCNC: 98 U/L (ref 40–150)
ALT SERPL W P-5'-P-CCNC: 28 U/L (ref 0–50)
ANION GAP SERPL CALCULATED.3IONS-SCNC: 14 MMOL/L (ref 7–15)
AST SERPL W P-5'-P-CCNC: 23 U/L (ref 0–45)
BASOPHILS # BLD AUTO: 0.1 10E3/UL (ref 0–0.2)
BASOPHILS NFR BLD AUTO: 1 %
BILIRUB SERPL-MCNC: 0.9 MG/DL
BUN SERPL-MCNC: 17.3 MG/DL (ref 8–23)
CALCIUM SERPL-MCNC: 10.1 MG/DL (ref 8.8–10.4)
CHLORIDE SERPL-SCNC: 101 MMOL/L (ref 98–107)
CREAT SERPL-MCNC: 1.2 MG/DL (ref 0.51–0.95)
CREAT UR-MCNC: 59.4 MG/DL
EGFRCR SERPLBLD CKD-EPI 2021: 49 ML/MIN/1.73M2
EOSINOPHIL # BLD AUTO: 0.2 10E3/UL (ref 0–0.7)
EOSINOPHIL NFR BLD AUTO: 2 %
ERYTHROCYTE [DISTWIDTH] IN BLOOD BY AUTOMATED COUNT: 12.9 % (ref 10–15)
GLUCOSE SERPL-MCNC: 126 MG/DL (ref 70–99)
HCO3 SERPL-SCNC: 23 MMOL/L (ref 22–29)
HCT VFR BLD AUTO: 42 % (ref 35–47)
HGB BLD-MCNC: 14.6 G/DL (ref 11.7–15.7)
HOLD SPECIMEN: NORMAL
IMM GRANULOCYTES # BLD: 0 10E3/UL
IMM GRANULOCYTES NFR BLD: 0 %
LYMPHOCYTES # BLD AUTO: 2.3 10E3/UL (ref 0.8–5.3)
LYMPHOCYTES NFR BLD AUTO: 25 %
MCH RBC QN AUTO: 30.2 PG (ref 26.5–33)
MCHC RBC AUTO-ENTMCNC: 34.8 G/DL (ref 31.5–36.5)
MCV RBC AUTO: 87 FL (ref 78–100)
MICROALBUMIN UR-MCNC: <12 MG/L
MICROALBUMIN/CREAT UR: NORMAL MG/G{CREAT}
MONOCYTES # BLD AUTO: 0.5 10E3/UL (ref 0–1.3)
MONOCYTES NFR BLD AUTO: 6 %
NEUTROPHILS # BLD AUTO: 5.9 10E3/UL (ref 1.6–8.3)
NEUTROPHILS NFR BLD AUTO: 66 %
PLATELET # BLD AUTO: 263 10E3/UL (ref 150–450)
POTASSIUM SERPL-SCNC: 3.9 MMOL/L (ref 3.4–5.3)
PROT SERPL-MCNC: 7.5 G/DL (ref 6.4–8.3)
RBC # BLD AUTO: 4.83 10E6/UL (ref 3.8–5.2)
SODIUM SERPL-SCNC: 138 MMOL/L (ref 135–145)
WBC # BLD AUTO: 8.9 10E3/UL (ref 4–11)

## 2024-08-21 PROCEDURE — 85048 AUTOMATED LEUKOCYTE COUNT: CPT | Mod: ZL | Performed by: NURSE PRACTITIONER

## 2024-08-21 PROCEDURE — 82043 UR ALBUMIN QUANTITATIVE: CPT | Mod: ZL | Performed by: NURSE PRACTITIONER

## 2024-08-21 PROCEDURE — 84155 ASSAY OF PROTEIN SERUM: CPT | Mod: ZL | Performed by: NURSE PRACTITIONER

## 2024-08-21 PROCEDURE — G0463 HOSPITAL OUTPT CLINIC VISIT: HCPCS

## 2024-08-21 PROCEDURE — 82374 ASSAY BLOOD CARBON DIOXIDE: CPT | Mod: ZL | Performed by: NURSE PRACTITIONER

## 2024-08-21 PROCEDURE — G2211 COMPLEX E/M VISIT ADD ON: HCPCS | Performed by: NURSE PRACTITIONER

## 2024-08-21 PROCEDURE — 82247 BILIRUBIN TOTAL: CPT | Mod: ZL | Performed by: NURSE PRACTITIONER

## 2024-08-21 PROCEDURE — 99214 OFFICE O/P EST MOD 30 MIN: CPT | Performed by: NURSE PRACTITIONER

## 2024-08-21 PROCEDURE — 36415 COLL VENOUS BLD VENIPUNCTURE: CPT | Mod: ZL | Performed by: NURSE PRACTITIONER

## 2024-08-21 RX ORDER — HYDROCHLOROTHIAZIDE 12.5 MG/1
12.5 TABLET ORAL DAILY
Qty: 90 TABLET | Refills: 1 | Status: SHIPPED | OUTPATIENT
Start: 2024-08-21

## 2024-08-21 RX ORDER — AMLODIPINE BESYLATE 10 MG/1
10 TABLET ORAL DAILY
Qty: 90 TABLET | Refills: 1 | Status: SHIPPED | OUTPATIENT
Start: 2024-08-21

## 2024-08-21 RX ORDER — METOPROLOL SUCCINATE 50 MG/1
50 TABLET, EXTENDED RELEASE ORAL DAILY
Qty: 90 TABLET | Refills: 1 | Status: SHIPPED | OUTPATIENT
Start: 2024-08-21

## 2024-08-21 RX ORDER — LOSARTAN POTASSIUM 100 MG/1
100 TABLET ORAL DAILY
Qty: 90 TABLET | Refills: 1 | Status: SHIPPED | OUTPATIENT
Start: 2024-08-21

## 2024-08-21 ASSESSMENT — PAIN SCALES - GENERAL: PAINLEVEL: NO PAIN (0)

## 2024-08-21 NOTE — PATIENT INSTRUCTIONS
Assessment & Plan         Chronic kidney disease, stage 3a (H)  - Albumin Random Urine Quantitative with Creat Ratio  - CBC with platelets and differential      Benign essential hypertension  - amLODIPine (NORVASC) 10 MG tablet; Take 1 tablet (10 mg) by mouth daily.  - hydroCHLOROthiazide 12.5 MG tablet; Take 1 tablet (12.5 mg) by mouth daily.  - losartan (COZAAR) 100 MG tablet; Take 1 tablet (100 mg) by mouth daily.  - metoprolol succinate ER (TOPROL XL) 50 MG 24 hr tablet; Take 1 tablet (50 mg) by mouth daily.  - Comprehensive metabolic panel          Return in about 6 months (around 2/21/2025), or Medicare physical.        Please continue to take all medication as directed  Please notify your pharmacy or our refill line of future refills needed.  Please return sooner than your next scheduled appointment with any concerns.        When your lab results return, we will call you with abnormal findings  You can also view this information in your MyChart, if you have an account.  Please call or [employer] message us with any questions you may have.          Yoana STRONG  [phone]

## 2024-08-21 NOTE — PROGRESS NOTES
Assessment & Plan         Chronic kidney disease, stage 3a (H)  - Albumin Random Urine Quantitative with Creat Ratio  - CBC with platelets and differential      Benign essential hypertension  - amLODIPine (NORVASC) 10 MG tablet; Take 1 tablet (10 mg) by mouth daily.  - hydroCHLOROthiazide 12.5 MG tablet; Take 1 tablet (12.5 mg) by mouth daily.  - losartan (COZAAR) 100 MG tablet; Take 1 tablet (100 mg) by mouth daily.  - metoprolol succinate ER (TOPROL XL) 50 MG 24 hr tablet; Take 1 tablet (50 mg) by mouth daily.  - Comprehensive metabolic panel          Return in about 6 months (around 2/21/2025), or Medicare physical.      Please continue to take all medication as directed  Please notify your pharmacy or our refill line of future refills needed.  Please return sooner than your next scheduled appointment with any concerns.      When your lab results return, we will call you with abnormal findings  You can also view this information in your MyChart, if you have an account.  Please call or [employer] message us with any questions you may have.          Yoana Bouchra [facility]  [phone]         Dominga is a 69 year old, presenting for the following health issues:  Hypertension and Kidney Problem        Hypertension Follow-up  Do you check your blood pressure regularly outside of the clinic? No   Are you following a low salt diet? Yes  Are your blood pressures ever more than 140 on the top number (systolic) OR more   than 90 on the bottom number (diastolic), for example 140/90? No        Chronic Kidney Disease Follow-up  Do you take any over the counter pain medicine?: Yes  What over the counter medicine are you taking for your pain?:  Advil    How often do you take this medicine?:  A few times a week          Patient Active Problem List   Diagnosis    ACP (advance care planning)    Benign essential hypertension    Morbid obesity (H)    Chronic kidney disease, stage 3a (H)    Acute right-sided low back pain with right-sided  sciatica     Past Surgical History:   Procedure Laterality Date     SECTION         Social History     Tobacco Use    Smoking status: Never    Smokeless tobacco: Never   Substance Use Topics    Alcohol use: No     Family History   Problem Relation Age of Onset    Hypertension Mother     Obesity Mother     Osteoporosis Mother     Arthritis Mother     Heart Failure Mother     Hypertension Father     Hyperlipidemia Father     Coronary Artery Disease Father         MI    Hypertension Brother     Hyperlipidemia Brother     Hypertension Son            Current Outpatient Medications   Medication Sig Dispense Refill    amLODIPine (NORVASC) 10 MG tablet Take 1 tablet (10 mg) by mouth daily 90 tablet 1    aspirin 81 MG EC tablet Take 1 tablet (81 mg) by mouth daily 90 tablet 3    Calcium Carbonate-Vitamin D (CALCIUM 600-D PO) Take 1,600 mg by mouth daily      Cholecalciferol (VITAMIN D3 PO) Take 1,000 Units by mouth daily      cyclobenzaprine (FLEXERIL) 10 MG tablet Take 1 tablet (10 mg) by mouth 2 times daily as needed for muscle spasms 60 tablet 1    hydroCHLOROthiazide (HYDRODIURIL) 12.5 MG tablet Take 1 tablet (12.5 mg) by mouth daily 90 tablet 1    ibuprofen (ADVIL/MOTRIN) 200 MG tablet Take 400 mg by mouth every 6 hours as needed for mild pain      lactobacillus rhamnosus, GG, (CULTURELL) capsule Take 1 capsule by mouth 2 times daily      losartan (COZAAR) 100 MG tablet Take 1 tablet (100 mg) by mouth daily 90 tablet 1    MAGNESIUM OXIDE PO Take 250 mg by mouth daily      metoprolol succinate ER (TOPROL XL) 50 MG 24 hr tablet Take 1 tablet (50 mg) by mouth daily 90 tablet 1         No Known Allergies        Recent Labs   Lab Test 24  1202 23  1454 23  1145 10/18/22  1139 22  1543 21  1047 20  1033 01/15/20  0946   A1C 5.6  --   --   --   --  5.3  --  5.8*   * 113*  --  110*   < > 118* 110* 133*   HDL 71 76  --  68   < > 67 68 70   TRIG 85 59  --  99   < > 87 72 87    ALT 23  --  25 24   < > 42 33 48   CR 1.32*  --  1.27* 1.22*   < > 1.49* 1.20* 1.28*   GFRESTIMATED 44*  --  46* 48*   < > 36* 47* 44*   GFRESTBLACK  --   --   --   --   --  42* 55* 51*   POTASSIUM 4.1  --  4.2 4.0   < > 3.9 3.9 3.8   TSH 2.36  --   --  2.88   < > 3.27 2.46 3.66    < > = values in this interval not displayed.          BP Readings from Last 3 Encounters:   08/21/24 126/78   02/20/24 124/68   12/01/23 136/72    Wt Readings from Last 3 Encounters:   08/21/24 92.5 kg (203 lb 14.4 oz)   02/20/24 91.2 kg (201 lb 1.6 oz)   12/01/23 94 kg (207 lb 3.2 oz)                  Review of Systems  Constitutional, HEENT, cardiovascular, pulmonary, GI, , musculoskeletal, neuro, skin, endocrine and psych systems are negative, except as otherwise noted.          Objective    /78 (BP Location: Left arm, Patient Position: Sitting, Cuff Size: Adult Regular)   Pulse 78   Temp 97.7  F (36.5  C) (Tympanic)   Resp 18   Wt 92.5 kg (203 lb 14.4 oz)   SpO2 97%   BMI 37.90 kg/m    Body mass index is 37.9 kg/m .        Physical Exam   GENERAL: alert and no distress  NECK: no adenopathy, no asymmetry, masses, or scars  RESP: lungs clear to auscultation - no rales, rhonchi or wheezes  CV: regular rate and rhythm, normal S1 S2, no S3 or S4, no murmur, click or rub, no peripheral edema  MS: no gross musculoskeletal defects noted, no edema  SKIN: no suspicious lesions or rashes  PSYCH: mentation appears normal, affect normal/bright        The longitudinal plan of care for the diagnosis(es)/condition(s) as documented were addressed during this visit. Due to the added complexity in care, I will continue to support Dominga in the subsequent management and with ongoing continuity of care.           Signed Electronically by: Yoana Shook CNP

## 2024-09-03 ENCOUNTER — TELEPHONE (OUTPATIENT)
Dept: MAMMOGRAPHY | Facility: OTHER | Age: 69
End: 2024-09-03

## 2024-09-03 ENCOUNTER — ANCILLARY PROCEDURE (OUTPATIENT)
Dept: MAMMOGRAPHY | Facility: OTHER | Age: 69
End: 2024-09-03
Attending: NURSE PRACTITIONER
Payer: MEDICARE

## 2024-09-03 DIAGNOSIS — Z12.31 ENCOUNTER FOR SCREENING MAMMOGRAM FOR BREAST CANCER: ICD-10-CM

## 2024-09-03 PROCEDURE — 77063 BREAST TOMOSYNTHESIS BI: CPT | Mod: TC

## 2024-11-09 ASSESSMENT — ANXIETY QUESTIONNAIRES: GAD7 TOTAL SCORE: 3

## 2024-12-10 NOTE — LETTER
[facility]  [address] [place] [address]  [phone]                  Dominga Salazar  [address] [place] [address]    November 1, 2017      Dear Dominga Salazar    APPOINTMENT REMINDER:   Our records indicates that it is time for you to be seen for a recheck.     Your current medication request will be approved for one refill but you will need to be seen before any additional refills can be approved.  Taking care of your health is important to us, and ongoing visits with your provider are vital to your care.    We look forward to seeing you in the near future.  You may call our office at [phone] to schedule a visit.     Please disregard this notice if you have already made an appointment.          Sincerely,      RASHIDA García, NP  Family Practice   97.7

## 2025-02-12 DIAGNOSIS — I10 BENIGN ESSENTIAL HYPERTENSION: ICD-10-CM

## 2025-02-12 RX ORDER — HYDROCHLOROTHIAZIDE 12.5 MG/1
12.5 TABLET ORAL DAILY
Qty: 90 TABLET | Refills: 1 | Status: SHIPPED | OUTPATIENT
Start: 2025-02-12

## 2025-02-12 NOTE — TELEPHONE ENCOUNTER
HYDROCHLOROTHIAZIDE 12.5 MG TB         Last Written Prescription Date:  8/21/24  Last Fill Quantity: 90,   # refills: 1  Last Office Visit: 8/21/24  Future Office visit:    Next 5 appointments (look out 90 days)      Mar 03, 2025 11:00 AM  (Arrive by 10:45 AM)  Adult Preventative Visit with Yoana Shook CNP  [facility] ([facility] ) [address]  [facility] [address]  [phone]             Routing refill request to provider for review/approval because:  Diuretics (Including Combos) Protocol [id number]/12/2025 12:31 AM   Protocol Details Has GFR on file in past 12 months and most recent value is normal       GFR Estimate   Date Value Ref Range Status   08/21/2024 49 (L) >60 mL/min/1.73m2 Final     Comment:     eGFR calculated using 2021 CKD-EPI equation.   02/22/2021 36 (L) >60 mL/min/[1.73_m2] Final     Comment:     Non  GFR Calc  Starting 12/18/2018, serum creatinine based estimated GFR (eGFR) will be   calculated using the Chronic Kidney Disease Epidemiology Collaboration   (CKD-EPI) equation.

## 2025-03-03 ENCOUNTER — OFFICE VISIT (OUTPATIENT)
Dept: FAMILY MEDICINE | Facility: OTHER | Age: 70
End: 2025-03-03
Attending: NURSE PRACTITIONER
Payer: MEDICARE

## 2025-03-03 VITALS
BODY MASS INDEX: 37.73 KG/M2 | TEMPERATURE: 97.8 F | HEART RATE: 82 BPM | RESPIRATION RATE: 18 BRPM | OXYGEN SATURATION: 96 % | HEIGHT: 62 IN | WEIGHT: 205 LBS | DIASTOLIC BLOOD PRESSURE: 72 MMHG | SYSTOLIC BLOOD PRESSURE: 120 MMHG

## 2025-03-03 DIAGNOSIS — Z00.00 ENCOUNTER FOR MEDICARE ANNUAL WELLNESS EXAM: Primary | ICD-10-CM

## 2025-03-03 DIAGNOSIS — I10 BENIGN ESSENTIAL HYPERTENSION: ICD-10-CM

## 2025-03-03 DIAGNOSIS — N18.31 CHRONIC KIDNEY DISEASE, STAGE 3A (H): ICD-10-CM

## 2025-03-03 LAB
ALBUMIN SERPL BCG-MCNC: 4.6 G/DL (ref 3.5–5.2)
ALBUMIN UR-MCNC: NEGATIVE MG/DL
ALP SERPL-CCNC: 96 U/L (ref 40–150)
ALT SERPL W P-5'-P-CCNC: 24 U/L (ref 0–50)
ANION GAP SERPL CALCULATED.3IONS-SCNC: 13 MMOL/L (ref 7–15)
APPEARANCE UR: CLEAR
AST SERPL W P-5'-P-CCNC: 22 U/L (ref 0–45)
BACTERIA #/AREA URNS HPF: ABNORMAL /HPF
BASOPHILS # BLD AUTO: 0 10E3/UL (ref 0–0.2)
BASOPHILS NFR BLD AUTO: 0 %
BILIRUB SERPL-MCNC: 0.9 MG/DL
BILIRUB UR QL STRIP: NEGATIVE
BUN SERPL-MCNC: 20.1 MG/DL (ref 8–23)
CALCIUM SERPL-MCNC: 9.9 MG/DL (ref 8.8–10.4)
CHLORIDE SERPL-SCNC: 104 MMOL/L (ref 98–107)
CHOLEST SERPL-MCNC: 201 MG/DL
COLOR UR AUTO: YELLOW
CREAT SERPL-MCNC: 1.21 MG/DL (ref 0.51–0.95)
EGFRCR SERPLBLD CKD-EPI 2021: 48 ML/MIN/1.73M2
EOSINOPHIL # BLD AUTO: 0.1 10E3/UL (ref 0–0.7)
EOSINOPHIL NFR BLD AUTO: 1 %
ERYTHROCYTE [DISTWIDTH] IN BLOOD BY AUTOMATED COUNT: 12.6 % (ref 10–15)
FASTING STATUS PATIENT QL REPORTED: ABNORMAL
FASTING STATUS PATIENT QL REPORTED: ABNORMAL
GLUCOSE SERPL-MCNC: 145 MG/DL (ref 70–99)
GLUCOSE UR STRIP-MCNC: NEGATIVE MG/DL
HCO3 SERPL-SCNC: 22 MMOL/L (ref 22–29)
HCT VFR BLD AUTO: 42.7 % (ref 35–47)
HDLC SERPL-MCNC: 74 MG/DL
HGB BLD-MCNC: 14.8 G/DL (ref 11.7–15.7)
HGB UR QL STRIP: NEGATIVE
IMM GRANULOCYTES # BLD: 0 10E3/UL
IMM GRANULOCYTES NFR BLD: 0 %
KETONES UR STRIP-MCNC: NEGATIVE MG/DL
LDLC SERPL CALC-MCNC: 111 MG/DL
LEUKOCYTE ESTERASE UR QL STRIP: ABNORMAL
LYMPHOCYTES # BLD AUTO: 2.4 10E3/UL (ref 0.8–5.3)
LYMPHOCYTES NFR BLD AUTO: 25 %
MCH RBC QN AUTO: 29.5 PG (ref 26.5–33)
MCHC RBC AUTO-ENTMCNC: 34.7 G/DL (ref 31.5–36.5)
MCV RBC AUTO: 85 FL (ref 78–100)
MONOCYTES # BLD AUTO: 0.5 10E3/UL (ref 0–1.3)
MONOCYTES NFR BLD AUTO: 5 %
NEUTROPHILS # BLD AUTO: 6.8 10E3/UL (ref 1.6–8.3)
NEUTROPHILS NFR BLD AUTO: 69 %
NITRATE UR QL: NEGATIVE
NONHDLC SERPL-MCNC: 127 MG/DL
PH UR STRIP: 6 [PH] (ref 5–7)
PLATELET # BLD AUTO: 281 10E3/UL (ref 150–450)
POTASSIUM SERPL-SCNC: 3.9 MMOL/L (ref 3.4–5.3)
PROT SERPL-MCNC: 7.9 G/DL (ref 6.4–8.3)
RBC # BLD AUTO: 5.01 10E6/UL (ref 3.8–5.2)
RBC #/AREA URNS AUTO: ABNORMAL /HPF
SODIUM SERPL-SCNC: 139 MMOL/L (ref 135–145)
SP GR UR STRIP: 1.01 (ref 1–1.03)
SQUAMOUS #/AREA URNS AUTO: ABNORMAL /LPF
TRIGL SERPL-MCNC: 80 MG/DL
TSH SERPL DL<=0.005 MIU/L-ACNC: 3.54 UIU/ML (ref 0.3–4.2)
UROBILINOGEN UR STRIP-ACNC: 0.2 E.U./DL
WBC # BLD AUTO: 9.8 10E3/UL (ref 4–11)
WBC #/AREA URNS AUTO: ABNORMAL /HPF

## 2025-03-03 PROCEDURE — 81003 URINALYSIS AUTO W/O SCOPE: CPT | Mod: ZL | Performed by: NURSE PRACTITIONER

## 2025-03-03 PROCEDURE — 84443 ASSAY THYROID STIM HORMONE: CPT | Mod: ZL | Performed by: NURSE PRACTITIONER

## 2025-03-03 PROCEDURE — 36415 COLL VENOUS BLD VENIPUNCTURE: CPT | Mod: ZL | Performed by: NURSE PRACTITIONER

## 2025-03-03 PROCEDURE — G0463 HOSPITAL OUTPT CLINIC VISIT: HCPCS

## 2025-03-03 PROCEDURE — 82465 ASSAY BLD/SERUM CHOLESTEROL: CPT | Mod: ZL | Performed by: NURSE PRACTITIONER

## 2025-03-03 PROCEDURE — 82565 ASSAY OF CREATININE: CPT | Mod: ZL | Performed by: NURSE PRACTITIONER

## 2025-03-03 PROCEDURE — 84155 ASSAY OF PROTEIN SERUM: CPT | Mod: ZL | Performed by: NURSE PRACTITIONER

## 2025-03-03 PROCEDURE — 81001 URINALYSIS AUTO W/SCOPE: CPT | Mod: ZL | Performed by: NURSE PRACTITIONER

## 2025-03-03 PROCEDURE — 85004 AUTOMATED DIFF WBC COUNT: CPT | Mod: ZL | Performed by: NURSE PRACTITIONER

## 2025-03-03 RX ORDER — LOSARTAN POTASSIUM 100 MG/1
100 TABLET ORAL DAILY
Qty: 90 TABLET | Refills: 1 | Status: SHIPPED | OUTPATIENT
Start: 2025-03-03

## 2025-03-03 RX ORDER — METOPROLOL SUCCINATE 50 MG/1
50 TABLET, EXTENDED RELEASE ORAL DAILY
Qty: 90 TABLET | Refills: 1 | Status: SHIPPED | OUTPATIENT
Start: 2025-03-03

## 2025-03-03 RX ORDER — AMLODIPINE BESYLATE 10 MG/1
10 TABLET ORAL DAILY
Qty: 90 TABLET | Refills: 1 | Status: SHIPPED | OUTPATIENT
Start: 2025-03-03

## 2025-03-03 SDOH — HEALTH STABILITY: PHYSICAL HEALTH
ON AVERAGE, HOW MANY DAYS PER WEEK DO YOU ENGAGE IN MODERATE TO STRENUOUS EXERCISE (LIKE A BRISK WALK)?: PATIENT DECLINED

## 2025-03-03 ASSESSMENT — PAIN SCALES - GENERAL: PAINLEVEL_OUTOF10: NO PAIN (0)

## 2025-03-03 ASSESSMENT — SOCIAL DETERMINANTS OF HEALTH (SDOH): HOW OFTEN DO YOU GET TOGETHER WITH FRIENDS OR RELATIVES?: PATIENT DECLINED

## 2025-03-03 NOTE — PROGRESS NOTES
Preventive Care Visit  [facility]  Yoana Bocuhra, HEMANT, Family Medicine        Mar 3, 2025            Assessment & Plan         Encounter for Medicare annual wellness exam  - CBC with platelets and differential; Future  - UA Macroscopic with reflex to Microscopic and Culture; Future      Benign essential hypertension  - Comprehensive metabolic panel; Future  - Lipid Profile (Chol, Trig, HDL, LDL calc); Future  - TSH with free T4 reflex; Future  - UA Macroscopic with reflex to Microscopic and Culture; Future  - metoprolol succinate ER (TOPROL XL) 50 MG 24 hr tablet; Take 1 tablet (50 mg) by mouth daily.  - losartan (COZAAR) 100 MG tablet; Take 1 tablet (100 mg) by mouth daily.  - amLODIPine (NORVASC) 10 MG tablet; Take 1 tablet (10 mg) by mouth daily.      Chronic kidney disease, stage 3a (H)  - Comprehensive metabolic panel; Future  - UA Macroscopic with reflex to Microscopic and Culture; Future          Return in about 6 months (around 9/3/2025).        All chronic conditions are stable at this time  Please continue to take all medication as directed  Please notify your pharmacy or our refill line of future refills needed.  Please return sooner than your next scheduled appointment with any concerns.      When your lab results return, we will call you with abnormal findings  You can also view this information in your MyChart, if you have an account.  Please call or [employer] message us with any questions you may have.          Yoana Shook [facility]  [phone]           Dominga is a 69 year old, presenting for the following:  Physical            3/3/2025    10:40 AM   Additional Questions   Roomed by Lashell FLANAGAN   Accompanied by Patricia         Hypertension Follow-up  Do you check your blood pressure regularly outside of the clinic? No   Are you following a low salt diet? Yes  Are your blood pressures ever more than 140 on the top number (systolic) OR more   than 90 on the bottom number (diastolic), for example 140/90? No does not  check outside of clinic          Chronic Kidney Disease Follow-up  Do you take any over the counter pain medicine?: Yes  What over the counter medicine are you taking for your pain?:  Advil     How often do you take this medicine?:  A few times a week          Chronic/Recurring Back Pain Follow Up  Where is your back pain located? (Select all that apply) low back right  How would you describe your back pain?  dull ache  Where does your back pain spread? nowhere  Since your last clinic visit for back pain, how has your pain changed? always present, but gets better and worse  Does your back pain interfere with your job? Not applicable  Since your last visit, have you tried any new treatment? No        Advance Care Planning  Patient does not have a Health Care Directive: Discussed advance care planning with patient; however, patient declined at this time.              3/3/2025   General Health   How would you rate your overall physical health? Good   Feel stress (tense, anxious, or unable to sleep) Only a little   (!) STRESS CONCERN      3/3/2025   Nutrition   Diet: Low salt         3/3/2025   Exercise   Days per week of moderate/strenous exercise Patient declined         3/3/2025   Social Factors   Frequency of gathering with friends or relatives Patient declined   Worry food won't last until get money to buy more No   Food not last or not have enough money for food? No   Do you have housing? (Housing is defined as stable permanent housing and does not include staying ouside in a car, in a tent, in an abandoned building, in an overnight shelter, or couch-surfing.) Yes   Are you worried about losing your housing? No   Lack of transportation? No   Unable to get utilities (heat,electricity)? No         3/3/2025   Fall Risk   Fallen 2 or more times in the past year? No   Trouble with walking or balance? No          3/3/2025   Activities of Daily Living- Home Safety   Needs help with the following daily activites None of  the above   Safety concerns in the home None of the above         3/3/2025   Dental   Dentist two times every year? Yes         3/3/2025   Hearing Screening   Hearing concerns? None of the above         3/3/2025   Driving Risk Screening   Patient/family members have concerns about driving No         3/3/2025   General Alertness/Fatigue Screening   Have you been more tired than usual lately? No         3/3/2025   Urinary Incontinence Screening   Bothered by leaking urine in past 6 months No            Today's PHQ-2 Score:       3/3/2025    10:45 AM   PHQ-2 ( 1999 [employer])   Q1: Little interest or pleasure in doing things 0   Q2: Feeling down, depressed or hopeless 0   PHQ-2 Score 0    Q1: Little interest or pleasure in doing things Not at all   Q2: Feeling down, depressed or hopeless Not at all   PHQ-2 Score 0       Patient-reported           3/3/2025   Substance Use   Alcohol more than 3/day or more than 7/wk Not Applicable   Do you have a current opioid prescription? No   How severe/bad is pain from 1 to 10? 0/10 (No Pain)   Do you use any other substances recreationally? No    (!) DECLINE       Multiple values from one day are sorted in reverse-chronological order     Social History     Tobacco Use    Smoking status: Never    Smokeless tobacco: Never   Vaping Use    Vaping status: Never Used   Substance Use Topics    Alcohol use: No    Drug use: No             9/3/2024   LAST FHS-7 RESULTS   1st degree relative breast or ovarian cancer No   Any relative bilateral breast cancer No   Any male have breast cancer No   Any ONE woman have BOTH breast AND ovarian cancer No   Any woman with breast cancer before 50yrs No   2 or more relatives with breast AND/OR ovarian cancer No       Mammogram is UTD      History of abnormal Pap smear: No - age 30-64 HPV with reflex Pap every 5 years recommended       ASCVD Risk   The 10-year ASCVD risk score (Sherry ALAN, et al., 2019) is: 9.4%    Values used to calculate the  score:      Age: 69 years      Sex: Female      Is Non- : No      Diabetic: No      Tobacco smoker: No      Systolic Blood Pressure: 120 mmHg      Is BP treated: Yes      HDL Cholesterol: 71 mg/dL      Total Cholesterol: 197 mg/dL                        Past Medical History:   Diagnosis Date    Gastroesophageal reflux disease without esophagitis 2019     Past Surgical History:   Procedure Laterality Date     SECTION       OB History    Para Term  AB Living   1 1 1 0 0 0   SAB IAB Ectopic Multiple Live Births   0 0 0 0 0      # Outcome Date GA Lbr Salvatore/2nd Weight Sex Type Anes PTL Lv   1 Term              Lab work is in process  Labs reviewed in EPIC  BP Readings from Last 3 Encounters:   25 120/72   24 126/78   24 124/68    Wt Readings from Last 3 Encounters:   25 93 kg (205 lb)   24 92.5 kg (203 lb 14.4 oz)   24 91.2 kg (201 lb 1.6 oz)                  Patient Active Problem List   Diagnosis    ACP (advance care planning)    Benign essential hypertension    Morbid obesity (H)    Chronic kidney disease, stage 3a (H)    Acute right-sided low back pain with right-sided sciatica     Past Surgical History:   Procedure Laterality Date     SECTION         Social History     Tobacco Use    Smoking status: Never    Smokeless tobacco: Never   Substance Use Topics    Alcohol use: No     Family History   Problem Relation Age of Onset    Hypertension Mother     Obesity Mother     Osteoporosis Mother     Arthritis Mother     Heart Failure Mother     Hypertension Father     Hyperlipidemia Father     Coronary Artery Disease Father         MI    Hypertension Brother     Hyperlipidemia Brother     Hypertension Son          Current Outpatient Medications   Medication Sig Dispense Refill    amLODIPine (NORVASC) 10 MG tablet Take 1 tablet (10 mg) by mouth daily. 90 tablet 1    aspirin 81 MG EC tablet Take 1 tablet (81 mg) by mouth  daily 90 tablet 3    Calcium Carbonate-Vitamin D (CALCIUM 600-D PO) Take 1,600 mg by mouth daily      Cholecalciferol (VITAMIN D3 PO) Take 1,000 Units by mouth daily      cyclobenzaprine (FLEXERIL) 10 MG tablet Take 1 tablet (10 mg) by mouth 2 times daily as needed for muscle spasms 60 tablet 1    hydroCHLOROthiazide 12.5 MG tablet TAKE 1 TABLET BY MOUTH EVERY DAY 90 tablet 1    ibuprofen (ADVIL/MOTRIN) 200 MG tablet Take 400 mg by mouth every 6 hours as needed for mild pain      lactobacillus rhamnosus, GG, (CULTURELL) capsule Take 1 capsule by mouth 2 times daily      losartan (COZAAR) 100 MG tablet Take 1 tablet (100 mg) by mouth daily. 90 tablet 1    MAGNESIUM OXIDE PO Take 250 mg by mouth daily      metoprolol succinate ER (TOPROL XL) 50 MG 24 hr tablet Take 1 tablet (50 mg) by mouth daily. 90 tablet 1     No Known Allergies  Recent Labs   Lab Test 08/21/24  1102 02/20/24  1202 12/01/23  1454 08/02/23  1145 10/18/22  1139 04/18/22  1543 02/22/21  1047 08/19/20  1033 01/15/20  0946   A1C  --  5.6  --   --   --   --  5.3  --  5.8*   LDL  --  109* 113*  --  110*   < > 118* 110* 133*   HDL  --  71 76  --  68   < > 67 68 70   TRIG  --  85 59  --  99   < > 87 72 87   ALT 28 23  --  25 24   < > 42 33 48   CR 1.20* 1.32*  --  1.27* 1.22*   < > 1.49* 1.20* 1.28*   GFRESTIMATED 49* 44*  --  46* 48*   < > 36* 47* 44*   GFRESTBLACK  --   --   --   --   --   --  42* 55* 51*   POTASSIUM 3.9 4.1  --  4.2 4.0   < > 3.9 3.9 3.8   TSH  --  2.36  --   --  2.88   < > 3.27 2.46 3.66    < > = values in this interval not displayed.                 Current providers sharing in care for this patient include:  Patient Care Team:  Yoana Shook CNP as PCP - General (Family Practice)  Yoana Shook CNP as Assigned PCP          The following health maintenance items are reviewed in Epic and correct as of today:  Health Maintenance   Topic Date Due    COLORECTAL CANCER SCREENING  Never done    MEDICARE ANNUAL WELLNESS VISIT  Never done     "LIPID  02/20/2025    COVID-19 Vaccine (9 - 2024-25 season) 05/12/2025    BMP  08/21/2025    MICROALBUMIN  08/21/2025    HEMOGLOBIN  08/21/2025    MAMMO SCREENING  09/03/2025    FALL RISK ASSESSMENT  03/03/2026    GLUCOSE  08/21/2027    ADVANCE CARE PLANNING  03/03/2030    RSV VACCINE (1 - 1-dose 75+ series) 07/12/2030    DTAP/TDAP/TD IMMUNIZATION (2 - Td or Tdap) 02/22/2031    PHQ-2 (once per calendar year)  Completed    INFLUENZA VACCINE  Completed    Pneumococcal Vaccine: 50+ Years  Completed    URINALYSIS  Completed    ZOSTER IMMUNIZATION  Completed    HEPATITIS C SCREENING  Addressed    HPV IMMUNIZATION  Aged Out    MENINGITIS IMMUNIZATION  Aged Out    DEXA  Discontinued           Review of Systems  Constitutional, HEENT, cardiovascular, pulmonary, GI, , musculoskeletal, neuro, skin, endocrine and psych systems are negative, except as otherwise noted.         Objective    Exam  /72 (BP Location: Right arm, Patient Position: Sitting, Cuff Size: Adult Large)   Pulse 82   Temp 97.8  F (36.6  C) (Tympanic)   Resp 18   Ht 1.562 m (5' 1.5\")   Wt 93 kg (205 lb)   SpO2 96%   Breastfeeding No   BMI 38.11 kg/m     Estimated body mass index is 38.11 kg/m  as calculated from the following:    Height as of this encounter: 1.562 m (5' 1.5\").    Weight as of this encounter: 93 kg (205 lb).          Physical Exam  GENERAL: alert and no distress  EYES: Eyes grossly normal to inspection, PERRL and conjunctivae and sclerae normal  HENT: ear canals and TM's normal, nose and mouth without ulcers or lesions  NECK: no adenopathy, no asymmetry, masses, or scars  RESP: lungs clear to auscultation - no rales, rhonchi or wheezes  CV: regular rate and rhythm, normal S1 S2, no S3 or S4, no murmur, click or rub, no peripheral edema  ABDOMEN: soft, nontender, no hepatosplenomegaly, no masses and bowel sounds normal  SKIN: no suspicious lesions or rashes  PSYCH: mentation appears normal, affect normal/bright              " 3/3/2025   Mini Cog   Clock Draw Score 2 Normal   3 Item Recall 3 objects recalled   Mini Cog Total Score 5             The longitudinal plan of care for the diagnosis(es)/condition(s) as documented were addressed during this visit. Due to the added complexity in care, I will continue to support Dominga in the subsequent management and with ongoing continuity of care.              Signed Electronically by: Yoana Shook CNP

## 2025-03-03 NOTE — PATIENT INSTRUCTIONS
Assessment & Plan         Encounter for Medicare annual wellness exam  - CBC with platelets and differential; Future  - UA Macroscopic with reflex to Microscopic and Culture; Future      Benign essential hypertension  - Comprehensive metabolic panel; Future  - Lipid Profile (Chol, Trig, HDL, LDL calc); Future  - TSH with free T4 reflex; Future  - UA Macroscopic with reflex to Microscopic and Culture; Future  - metoprolol succinate ER (TOPROL XL) 50 MG 24 hr tablet; Take 1 tablet (50 mg) by mouth daily.  - losartan (COZAAR) 100 MG tablet; Take 1 tablet (100 mg) by mouth daily.  - amLODIPine (NORVASC) 10 MG tablet; Take 1 tablet (10 mg) by mouth daily.      Chronic kidney disease, stage 3a (H)  - Comprehensive metabolic panel; Future  - UA Macroscopic with reflex to Microscopic and Culture; Future          Return in about 6 months (around 9/3/2025).        All chronic conditions are stable at this time  Please continue to take all medication as directed  Please notify your pharmacy or our refill line of future refills needed.  Please return sooner than your next scheduled appointment with any concerns.      When your lab results return, we will call you with abnormal findings  You can also view this information in your MyChart, if you have an account.  Please call or [facility] message us with any questions you may have.          [facility]AMES[facility]  [phone]     Preventive Care Advice   This is general advice given by our system to help you stay healthy. However, your care team may have specific advice just for you. Please talk to your care team about your preventive care needs.  Nutrition  Eat 5 or more servings of fruits and vegetables each day.  Try wheat bread, brown rice and whole grain pasta (instead of white bread, rice, and pasta).  Get enough calcium and vitamin D. Check the label on foods and aim for 100% of the RDA (recommended daily allowance).  Lifestyle  Exercise at least 150 minutes each week  (30  minutes a day, 5 days a week).  Do muscle strengthening activities 2 days a week. These help control your weight and prevent disease.  No smoking.  Wear sunscreen to prevent skin cancer.  Have a dental exam and cleaning every 6 months.  Yearly exams  See your health care team every year to talk about:  Any changes in your health.  Any medicines your care team has prescribed.  Preventive care, family planning, and ways to prevent chronic diseases.  Shots (vaccines)   HPV shots (up to age 26), if you've never had them before.  Hepatitis B shots (up to age 59), if you've never had them before.  COVID-19 shot: Get this shot when it's due.  Flu shot: Get a flu shot every year.  Tetanus shot: Get a tetanus shot every 10 years.  Pneumococcal, hepatitis A, and RSV shots: Ask your care team if you need these based on your risk.  Shingles shot (for age 50 and up)  General health tests  Diabetes screening:  Starting at age 35, Get screened for diabetes at least every 3 years.  If you are younger than age 35, ask your care team if you should be screened for diabetes.  Cholesterol test: At age 39, start having a cholesterol test every 5 years, or more often if advised.  Bone density scan (DEXA): At age 50, ask your care team if you should have this scan for osteoporosis (brittle bones).  Hepatitis C: Get tested at least once in your life.  STIs (sexually transmitted infections)  Before age 24: Ask your care team if you should be screened for STIs.  After age 24: Get screened for STIs if you're at risk. You are at risk for STIs (including HIV) if:  You are sexually active with more than one person.  You don't use condoms every time.  You or a partner was diagnosed with a sexually transmitted infection.  If you are at risk for HIV, ask about PrEP medicine to prevent HIV.  Get tested for HIV at least once in your life, whether you are at risk for HIV or not.  Cancer screening tests  Cervical cancer screening: If you have a cervix,  begin getting regular cervical cancer screening tests starting at age 21.  Breast cancer scan (mammogram): If you've ever had breasts, begin having regular mammograms starting at age 40. This is a scan to check for breast cancer.  Colon cancer screening: It is important to start screening for colon cancer at age 45.  Have a colonoscopy test every 10 years (or more often if you're at risk) Or, ask your provider about stool tests like a FIT test every year or Cologuard test every 3 years.  To learn more about your testing options, visit:   .  For help making a decision, visit:   https://bit.ly/qq09047.  Prostate cancer screening test: If you have a prostate, ask your care team if a prostate cancer screening test (PSA) at age 55 is right for you.  Lung cancer screening: If you are a current or former smoker ages 50 to 80, ask your care team if ongoing lung cancer screenings are right for you.  For informational purposes only. Not to replace the advice of your health care provider. Copyright   2023 [employer]. All rights reserved. Clinically reviewed by the [facility] Transitions Program. [employer] 210117 - REV 01/24.

## 2025-03-09 ENCOUNTER — HEALTH MAINTENANCE LETTER (OUTPATIENT)
Age: 70
End: 2025-03-09

## 2025-08-04 ENCOUNTER — PATIENT OUTREACH (OUTPATIENT)
Dept: CARE COORDINATION | Facility: CLINIC | Age: 70
End: 2025-08-04

## 2025-08-09 DIAGNOSIS — I10 BENIGN ESSENTIAL HYPERTENSION: ICD-10-CM

## 2025-08-11 RX ORDER — HYDROCHLOROTHIAZIDE 12.5 MG/1
12.5 TABLET ORAL DAILY
Qty: 90 TABLET | Refills: 1 | Status: SHIPPED | OUTPATIENT
Start: 2025-08-11

## 2025-08-18 DIAGNOSIS — I10 BENIGN ESSENTIAL HYPERTENSION: ICD-10-CM

## 2025-08-18 RX ORDER — AMLODIPINE BESYLATE 10 MG/1
10 TABLET ORAL DAILY
Qty: 90 TABLET | Refills: 1 | Status: SHIPPED | OUTPATIENT
Start: 2025-08-18